# Patient Record
Sex: MALE | Race: BLACK OR AFRICAN AMERICAN | Employment: FULL TIME | URBAN - METROPOLITAN AREA
[De-identification: names, ages, dates, MRNs, and addresses within clinical notes are randomized per-mention and may not be internally consistent; named-entity substitution may affect disease eponyms.]

---

## 2018-01-01 NOTE — ED PROVIDER NOTES
Patient Seen in: [facility] AND [facility] Emergency Department    History   Patient presents with:  Dyspnea JONAH SOB (respiratory)      HPI    The patient presents complaining of a short episode of shortness of breath when he got use the bathroom.   He states he rate, regular rhythm and intact distal pulses. No murmur heard. Pulmonary/Chest: Effort normal and breath sounds normal. No stridor. No respiratory distress. He has no wheezes. He has no rales. Abdominal: Soft. He exhibits no distension.  There is no appointment as soon as possible for a visit in 3 days        Medications Prescribed:  Discharge Medication List as of 1/1/2018 12:19 AM    START taking these medications    ALPRAZolam 0.5 MG Oral Tab  Take 1 tablet (0.5 mg total) by mouth 2 (two) times husam

## 2018-01-01 NOTE — CM/SW NOTE
Patient requesting assistance with transportation home from the ED>   [employer] contacted thru his [facility] medicaid insurance, due to holiday , not transport ion available. Authorized cab voucher to assist patient in safe passage home.  Currently waiting

## 2018-01-01 NOTE — ED INITIAL ASSESSMENT (HPI)
Pt had difficulty breathing when he got up to use the bathroom. When EMS arrived, pt states it had resolved but came in to get checked out. No distress noted at this time, pt speaking in full sentences.

## 2018-02-06 ENCOUNTER — HOSPITAL ENCOUNTER (INPATIENT)
Facility: HOSPITAL | Age: 58
LOS: 2 days | Discharge: HOME | DRG: 291 | End: 2018-02-09
Admitting: HOSPITALIST
Payer: MEDICAID

## 2018-02-06 ENCOUNTER — APPOINTMENT (OUTPATIENT)
Dept: GENERAL RADIOLOGY | Facility: HOSPITAL | Age: 58
DRG: 291 | End: 2018-02-06
Payer: MEDICAID

## 2018-02-06 ENCOUNTER — APPOINTMENT (OUTPATIENT)
Dept: CT IMAGING | Facility: HOSPITAL | Age: 58
DRG: 291 | End: 2018-02-06
Attending: EMERGENCY MEDICINE
Payer: MEDICAID

## 2018-02-06 DIAGNOSIS — J18.9 COMMUNITY ACQUIRED PNEUMONIA OF RIGHT LOWER LOBE OF LUNG: Primary | ICD-10-CM

## 2018-02-06 DIAGNOSIS — R07.9 ACUTE CHEST PAIN: ICD-10-CM

## 2018-02-06 DIAGNOSIS — R77.8 ELEVATED TROPONIN I LEVEL: ICD-10-CM

## 2018-02-06 PROBLEM — D64.9 ANEMIA: Status: ACTIVE | Noted: 2018-02-06

## 2018-02-06 LAB
ANION GAP SERPL CALC-SCNC: 8 MMOL/L (ref 0–18)
BASOPHILS # BLD: 0 K/UL (ref 0–0.2)
BASOPHILS NFR BLD: 1 %
BNP SERPL-MCNC: 790 PG/ML (ref 0–100)
BUN SERPL-MCNC: 16 MG/DL (ref 8–20)
BUN/CREAT SERPL: 9.7 (ref 10–20)
CALCIUM SERPL-MCNC: 8 MG/DL (ref 8.5–10.5)
CHLORIDE SERPL-SCNC: 107 MMOL/L (ref 95–110)
CHOLEST SERPL-MCNC: 160 MG/DL (ref 110–200)
CO2 SERPL-SCNC: 23 MMOL/L (ref 22–32)
CREAT SERPL-MCNC: 1.65 MG/DL (ref 0.5–1.5)
D DIMER PPP FEU-MCNC: 0.66 MCG/ML (ref ?–0.55)
EOSINOPHIL # BLD: 0 K/UL (ref 0–0.7)
EOSINOPHIL NFR BLD: 0 %
ERYTHROCYTE [DISTWIDTH] IN BLOOD BY AUTOMATED COUNT: 14.7 % (ref 11–15)
GLUCOSE SERPL-MCNC: 106 MG/DL (ref 70–99)
HCT VFR BLD AUTO: 36 % (ref 41–52)
HDLC SERPL-MCNC: 57 MG/DL
HGB BLD-MCNC: 11.9 G/DL (ref 13.5–17.5)
LDLC SERPL CALC-MCNC: 87 MG/DL (ref 0–99)
LYMPHOCYTES # BLD: 0.8 K/UL (ref 1–4)
LYMPHOCYTES NFR BLD: 15 %
MCH RBC QN AUTO: 30.5 PG (ref 27–32)
MCHC RBC AUTO-ENTMCNC: 33 G/DL (ref 32–37)
MCV RBC AUTO: 92.5 FL (ref 80–100)
MONOCYTES # BLD: 0.7 K/UL (ref 0–1)
MONOCYTES NFR BLD: 14 %
NEUTROPHILS # BLD AUTO: 3.6 K/UL (ref 1.8–7.7)
NEUTROPHILS NFR BLD: 70 %
NONHDLC SERPL-MCNC: 103 MG/DL
OSMOLALITY UR CALC.SUM OF ELEC: 288 MOSM/KG (ref 275–295)
PLATELET # BLD AUTO: 173 K/UL (ref 140–400)
PMV BLD AUTO: 10.2 FL (ref 7.4–10.3)
POTASSIUM SERPL-SCNC: 3.9 MMOL/L (ref 3.3–5.1)
RBC # BLD AUTO: 3.89 M/UL (ref 4.5–5.9)
SODIUM SERPL-SCNC: 138 MMOL/L (ref 136–144)
TRIGL SERPL-MCNC: 78 MG/DL (ref 1–149)
TROPONIN I SERPL-MCNC: 0.05 NG/ML (ref ?–0.03)
WBC # BLD AUTO: 5.2 K/UL (ref 4–11)

## 2018-02-06 PROCEDURE — 71046 X-RAY EXAM CHEST 2 VIEWS: CPT

## 2018-02-06 PROCEDURE — 80048 BASIC METABOLIC PNL TOTAL CA: CPT | Performed by: EMERGENCY MEDICINE

## 2018-02-06 PROCEDURE — 96365 THER/PROPH/DIAG IV INF INIT: CPT

## 2018-02-06 PROCEDURE — 94640 AIRWAY INHALATION TREATMENT: CPT

## 2018-02-06 PROCEDURE — 71260 CT THORAX DX C+: CPT | Performed by: EMERGENCY MEDICINE

## 2018-02-06 PROCEDURE — 99285 EMERGENCY DEPT VISIT HI MDM: CPT

## 2018-02-06 PROCEDURE — 84484 ASSAY OF TROPONIN QUANT: CPT | Performed by: EMERGENCY MEDICINE

## 2018-02-06 PROCEDURE — 96375 TX/PRO/DX INJ NEW DRUG ADDON: CPT

## 2018-02-06 PROCEDURE — 85025 COMPLETE CBC W/AUTO DIFF WBC: CPT | Performed by: EMERGENCY MEDICINE

## 2018-02-06 PROCEDURE — 83880 ASSAY OF NATRIURETIC PEPTIDE: CPT | Performed by: EMERGENCY MEDICINE

## 2018-02-06 PROCEDURE — 93005 ELECTROCARDIOGRAM TRACING: CPT

## 2018-02-06 PROCEDURE — 96368 THER/DIAG CONCURRENT INF: CPT

## 2018-02-06 PROCEDURE — 85379 FIBRIN DEGRADATION QUANT: CPT | Performed by: EMERGENCY MEDICINE

## 2018-02-06 PROCEDURE — 87631 RESP VIRUS 3-5 TARGETS: CPT | Performed by: EMERGENCY MEDICINE

## 2018-02-06 PROCEDURE — 93010 ELECTROCARDIOGRAM REPORT: CPT | Performed by: EMERGENCY MEDICINE

## 2018-02-06 PROCEDURE — 36415 COLL VENOUS BLD VENIPUNCTURE: CPT

## 2018-02-06 PROCEDURE — 80061 LIPID PANEL: CPT | Performed by: EMERGENCY MEDICINE

## 2018-02-06 RX ORDER — IPRATROPIUM BROMIDE AND ALBUTEROL SULFATE 2.5; .5 MG/3ML; MG/3ML
3 SOLUTION RESPIRATORY (INHALATION) ONCE
Status: COMPLETED | OUTPATIENT
Start: 2018-02-06 | End: 2018-02-06

## 2018-02-07 PROBLEM — R07.9 ACUTE CHEST PAIN: Status: ACTIVE | Noted: 2018-02-07

## 2018-02-07 PROBLEM — R77.8 ELEVATED TROPONIN I LEVEL: Status: ACTIVE | Noted: 2018-02-07

## 2018-02-07 PROBLEM — R79.89 ELEVATED TROPONIN I LEVEL: Status: ACTIVE | Noted: 2018-02-07

## 2018-02-07 LAB
ANION GAP SERPL CALC-SCNC: 7 MMOL/L (ref 0–18)
BASOPHILS # BLD: 0 K/UL (ref 0–0.2)
BASOPHILS NFR BLD: 0 %
BUN SERPL-MCNC: 14 MG/DL (ref 8–20)
BUN/CREAT SERPL: 9.2 (ref 10–20)
CALCIUM SERPL-MCNC: 7.9 MG/DL (ref 8.5–10.5)
CHLORIDE SERPL-SCNC: 111 MMOL/L (ref 95–110)
CO2 SERPL-SCNC: 22 MMOL/L (ref 22–32)
CREAT SERPL-MCNC: 1.52 MG/DL (ref 0.5–1.5)
EOSINOPHIL # BLD: 0 K/UL (ref 0–0.7)
EOSINOPHIL NFR BLD: 0 %
ERYTHROCYTE [DISTWIDTH] IN BLOOD BY AUTOMATED COUNT: 14.3 % (ref 11–15)
FLUAV + FLUBV RNA SPEC NAA+PROBE: NEGATIVE
FLUAV + FLUBV RNA SPEC NAA+PROBE: NEGATIVE
FLUAV + FLUBV RNA SPEC NAA+PROBE: POSITIVE
GLUCOSE SERPL-MCNC: 103 MG/DL (ref 70–99)
HBA1C MFR BLD: 5.9 % (ref 4–6)
HCT VFR BLD AUTO: 35.3 % (ref 41–52)
HGB BLD-MCNC: 11.9 G/DL (ref 13.5–17.5)
LACTATE SERPL-SCNC: 1.1 MMOL/L (ref 0.5–2.2)
LYMPHOCYTES # BLD: 0.7 K/UL (ref 1–4)
LYMPHOCYTES NFR BLD: 15 %
MAGNESIUM SERPL-MCNC: 1.7 MG/DL (ref 1.8–2.5)
MCH RBC QN AUTO: 31.1 PG (ref 27–32)
MCHC RBC AUTO-ENTMCNC: 33.7 G/DL (ref 32–37)
MCV RBC AUTO: 92.1 FL (ref 80–100)
MONOCYTES # BLD: 0.5 K/UL (ref 0–1)
MONOCYTES NFR BLD: 10 %
NEUTROPHILS # BLD AUTO: 3.4 K/UL (ref 1.8–7.7)
NEUTROPHILS NFR BLD: 74 %
OSMOLALITY UR CALC.SUM OF ELEC: 291 MOSM/KG (ref 275–295)
PLATELET # BLD AUTO: 142 K/UL (ref 140–400)
PMV BLD AUTO: 9.3 FL (ref 7.4–10.3)
POTASSIUM SERPL-SCNC: 3.8 MMOL/L (ref 3.3–5.1)
PROCALCITONIN SERPL-MCNC: <0.05 NG/ML (ref ?–0.11)
RBC # BLD AUTO: 3.83 M/UL (ref 4.5–5.9)
SODIUM SERPL-SCNC: 140 MMOL/L (ref 136–144)
TROPONIN I SERPL-MCNC: 0.05 NG/ML (ref ?–0.03)
TROPONIN I SERPL-MCNC: 0.05 NG/ML (ref ?–0.03)
WBC # BLD AUTO: 4.6 K/UL (ref 4–11)

## 2018-02-07 PROCEDURE — 87040 BLOOD CULTURE FOR BACTERIA: CPT | Performed by: EMERGENCY MEDICINE

## 2018-02-07 PROCEDURE — 94640 AIRWAY INHALATION TREATMENT: CPT

## 2018-02-07 PROCEDURE — 85025 COMPLETE CBC W/AUTO DIFF WBC: CPT | Performed by: HOSPITALIST

## 2018-02-07 PROCEDURE — 83605 ASSAY OF LACTIC ACID: CPT | Performed by: EMERGENCY MEDICINE

## 2018-02-07 PROCEDURE — 84484 ASSAY OF TROPONIN QUANT: CPT | Performed by: HOSPITALIST

## 2018-02-07 PROCEDURE — 83735 ASSAY OF MAGNESIUM: CPT | Performed by: HOSPITALIST

## 2018-02-07 PROCEDURE — 83036 HEMOGLOBIN GLYCOSYLATED A1C: CPT | Performed by: HOSPITALIST

## 2018-02-07 PROCEDURE — A4216 STERILE WATER/SALINE, 10 ML: HCPCS | Performed by: HOSPITALIST

## 2018-02-07 PROCEDURE — 84145 PROCALCITONIN (PCT): CPT | Performed by: HOSPITALIST

## 2018-02-07 PROCEDURE — 80048 BASIC METABOLIC PNL TOTAL CA: CPT | Performed by: HOSPITALIST

## 2018-02-07 RX ORDER — ACETAMINOPHEN 325 MG/1
650 TABLET ORAL EVERY 6 HOURS PRN
Status: DISCONTINUED | OUTPATIENT
Start: 2018-02-07 | End: 2018-02-09

## 2018-02-07 RX ORDER — IPRATROPIUM BROMIDE AND ALBUTEROL SULFATE 2.5; .5 MG/3ML; MG/3ML
3 SOLUTION RESPIRATORY (INHALATION) ONCE
Status: COMPLETED | OUTPATIENT
Start: 2018-02-07 | End: 2018-02-07

## 2018-02-07 RX ORDER — MAGNESIUM OXIDE 400 MG (241.3 MG MAGNESIUM) TABLET
400 TABLET ONCE
Status: COMPLETED | OUTPATIENT
Start: 2018-02-07 | End: 2018-02-07

## 2018-02-07 RX ORDER — ASPIRIN 81 MG/1
81 TABLET, CHEWABLE ORAL DAILY
Status: DISCONTINUED | OUTPATIENT
Start: 2018-02-07 | End: 2018-02-09

## 2018-02-07 RX ORDER — LISINOPRIL 2.5 MG/1
2.5 TABLET ORAL DAILY
Status: DISCONTINUED | OUTPATIENT
Start: 2018-02-07 | End: 2018-02-09

## 2018-02-07 RX ORDER — FUROSEMIDE 40 MG/1
40 TABLET ORAL DAILY
COMMUNITY
End: 2018-02-14

## 2018-02-07 RX ORDER — FUROSEMIDE 10 MG/ML
40 INJECTION INTRAMUSCULAR; INTRAVENOUS DAILY
Status: DISCONTINUED | OUTPATIENT
Start: 2018-02-07 | End: 2018-02-09

## 2018-02-07 RX ORDER — ONDANSETRON 2 MG/ML
4 INJECTION INTRAMUSCULAR; INTRAVENOUS EVERY 6 HOURS PRN
Status: DISCONTINUED | OUTPATIENT
Start: 2018-02-07 | End: 2018-02-09

## 2018-02-07 RX ORDER — PREDNISONE 20 MG/1
40 TABLET ORAL
Status: DISCONTINUED | OUTPATIENT
Start: 2018-02-07 | End: 2018-02-09

## 2018-02-07 RX ORDER — LORAZEPAM 2 MG/ML
0.5 INJECTION INTRAMUSCULAR ONCE
Status: COMPLETED | OUTPATIENT
Start: 2018-02-07 | End: 2018-02-07

## 2018-02-07 RX ORDER — POTASSIUM CHLORIDE 20 MEQ/1
40 TABLET, EXTENDED RELEASE ORAL ONCE
Status: COMPLETED | OUTPATIENT
Start: 2018-02-07 | End: 2018-02-07

## 2018-02-07 RX ORDER — CARVEDILOL 3.12 MG/1
3.12 TABLET ORAL 2 TIMES DAILY
COMMUNITY
End: 2018-03-15

## 2018-02-07 RX ORDER — IPRATROPIUM BROMIDE AND ALBUTEROL SULFATE 2.5; .5 MG/3ML; MG/3ML
3 SOLUTION RESPIRATORY (INHALATION)
Status: DISCONTINUED | OUTPATIENT
Start: 2018-02-08 | End: 2018-02-09

## 2018-02-07 RX ORDER — IPRATROPIUM BROMIDE AND ALBUTEROL SULFATE 2.5; .5 MG/3ML; MG/3ML
3 SOLUTION RESPIRATORY (INHALATION)
Status: DISCONTINUED | OUTPATIENT
Start: 2018-02-07 | End: 2018-02-07

## 2018-02-07 RX ORDER — ALPRAZOLAM 0.5 MG/1
0.5 TABLET ORAL 2 TIMES DAILY PRN
Status: DISCONTINUED | OUTPATIENT
Start: 2018-02-07 | End: 2018-02-09

## 2018-02-07 RX ORDER — BISACODYL 10 MG
10 SUPPOSITORY, RECTAL RECTAL
Status: DISCONTINUED | OUTPATIENT
Start: 2018-02-07 | End: 2018-02-09

## 2018-02-07 RX ORDER — LISINOPRIL 2.5 MG/1
2.5 TABLET ORAL DAILY
COMMUNITY
End: 2018-03-21

## 2018-02-07 RX ORDER — POLYETHYLENE GLYCOL 3350 17 G/17G
17 POWDER, FOR SOLUTION ORAL DAILY PRN
Status: DISCONTINUED | OUTPATIENT
Start: 2018-02-07 | End: 2018-02-09

## 2018-02-07 RX ORDER — ATORVASTATIN CALCIUM 40 MG/1
40 TABLET, FILM COATED ORAL NIGHTLY
COMMUNITY
End: 2018-03-21

## 2018-02-07 RX ORDER — SODIUM CHLORIDE 0.9 % (FLUSH) 0.9 %
3 SYRINGE (ML) INJECTION AS NEEDED
Status: DISCONTINUED | OUTPATIENT
Start: 2018-02-07 | End: 2018-02-09

## 2018-02-07 RX ORDER — FUROSEMIDE 40 MG/1
40 TABLET ORAL
Status: DISCONTINUED | OUTPATIENT
Start: 2018-02-07 | End: 2018-02-07

## 2018-02-07 RX ORDER — AZITHROMYCIN 250 MG/1
500 TABLET, FILM COATED ORAL
Status: COMPLETED | OUTPATIENT
Start: 2018-02-07 | End: 2018-02-08

## 2018-02-07 RX ORDER — ATORVASTATIN CALCIUM 40 MG/1
40 TABLET, FILM COATED ORAL NIGHTLY
Status: DISCONTINUED | OUTPATIENT
Start: 2018-02-07 | End: 2018-02-09

## 2018-02-07 RX ORDER — HEPARIN SODIUM 5000 [USP'U]/ML
5000 INJECTION, SOLUTION INTRAVENOUS; SUBCUTANEOUS EVERY 8 HOURS SCHEDULED
Status: DISCONTINUED | OUTPATIENT
Start: 2018-02-07 | End: 2018-02-09

## 2018-02-07 RX ORDER — CARVEDILOL 3.12 MG/1
3.12 TABLET ORAL 2 TIMES DAILY
Status: DISCONTINUED | OUTPATIENT
Start: 2018-02-07 | End: 2018-02-09

## 2018-02-07 NOTE — CM/SW NOTE
2/7CM-MD orders received in regards to discharge planning. This Writer attempted to see the Patient but he was unarousable. Case Management will continue to follow.      -[facility] [id number]

## 2018-02-07 NOTE — H&P
[facility] Hospitalist H&P       CC: Patient presents with:  Cough/URI     PCP: No primary care provider on file.  PCP at [address] / PLAN:     Mr. Jose Yee is a 57 yo M with PMH of DCM (EF 25%), CKD 4, HTN, multisubstance abuse who Ling Keita is a 55 yo M with PMH of DCM (EF 25%), CKD 4, HTN, multisubstance abuse who presented with cough and shortness of breath for past 2 days. States he felt like he was coming down with the flu, felt fatigued. No sick contacts at home.  Denies chest pain dry  EXT: no edema    Diagnostic Data:    CBC/Chem    Recent Labs   Lab  02/06/18   2136  02/07/18   0532   RBC  3.89*  3.83*   HGB  11.9*  11.9*   HCT  36.0*  35.3*   MCV  92.5  92.1   MCH  30.5  31.1   MCHC  33.0  33.7   RDW  14.7  14.3   WBC  5.2  4.6 peribronchial thickening in the bilateral lower lobes suggesting bronchiolitis. 3. Subcarinal lymphadenopathy and right hilar lymphadenopathy. Borderline left hilar lymphadenopathy 4. No aortic dissection or aneurysmal dilatation. No pulmonary embolism.

## 2018-02-07 NOTE — ED PROVIDER NOTES
Patient Seen in: [facility] Emergency Department    History   Patient presents with:  Cough/URI    Stated Complaint: flu symptoms    HPI    Patient presents emergency department with cough, congestion over the last 2 days.   He states that he has h rales.   Abdominal: Soft. He exhibits no distension. There is no tenderness. Musculoskeletal: Normal range of motion. He exhibits no tenderness. Neurological: He is alert and oriented to person, place, and time. Skin: Skin is warm and dry.  No rash no ED Course as of Feb 06 2339  ------------------------------------------------------------       [facility]       Pulse Ox: 94%, Normal,     Cardiac Monitor: Pulse Readings from Last 1 Encounters:  02/06/18 : 87  , sinus,      Radiology findings: Xr Chest Pa

## 2018-02-07 NOTE — CONSULTS
Reason for Consultation: Heart failure    Assessment/Plan:     1. Dyspnea  - combination of  Systolic heart failure and RSV  2.  Acute on chronic systolic heart failure  - EF 20-30% TTE 8/17  - Yvonne 8/17 at [facility]: normal perfusion and EF 24%  - refused Lif ipratropium-albuterol (DUONEB) nebulizer solution 3 mL 3 mL Nebulization 6 times per day   azithromycin (ZITHROMAX) tab 500 mg 500 mg Oral Daily   predniSONE (DELTASONE) tab 40 mg 40 mg Oral Daily with breakfast   ALPRAZolam (XANAX) tab 0.5 mg 0.5 mg Ora NEURO/PSYCH:alert and oriented. Normal affect. CV:   PALP:PMI not displaced; no lifts, thrills or rubs. AUSC:regular rhythm; normal S1, S2 without S3; 1/6 systolic murmur. CAROTIDS:carotid pulses normal.   ABD AORTA:not enlarged.    FEM:femoral pulse lymphadenopathy. Borderline left hilar lymphadenopathy 4. No aortic dissection or aneurysmal dilatation. No pulmonary embolism. Thank you for allowing me to participate in the care of your patient.        *This note was dictated, in part, usi

## 2018-02-08 ENCOUNTER — APPOINTMENT (OUTPATIENT)
Dept: CV DIAGNOSTICS | Facility: HOSPITAL | Age: 58
DRG: 291 | End: 2018-02-08
Attending: INTERNAL MEDICINE
Payer: MEDICAID

## 2018-02-08 LAB
ANION GAP SERPL CALC-SCNC: 14 MMOL/L (ref 0–18)
BENZODIAZ UR QL SCN: DETECTED
BUN SERPL-MCNC: 26 MG/DL (ref 8–20)
BUN/CREAT SERPL: 15.6 (ref 10–20)
BZE UR QL SCN: DETECTED
CALCIUM SERPL-MCNC: 8.6 MG/DL (ref 8.5–10.5)
CHLORIDE SERPL-SCNC: 105 MMOL/L (ref 95–110)
CO2 SERPL-SCNC: 22 MMOL/L (ref 22–32)
CREAT SERPL-MCNC: 1.67 MG/DL (ref 0.5–1.5)
ERYTHROCYTE [DISTWIDTH] IN BLOOD BY AUTOMATED COUNT: 14.5 % (ref 11–15)
GLUCOSE SERPL-MCNC: 96 MG/DL (ref 70–99)
HCT VFR BLD AUTO: 37.1 % (ref 41–52)
HGB BLD-MCNC: 12.6 G/DL (ref 13.5–17.5)
MAGNESIUM SERPL-MCNC: 1.7 MG/DL (ref 1.8–2.5)
MCH RBC QN AUTO: 31 PG (ref 27–32)
MCHC RBC AUTO-ENTMCNC: 34 G/DL (ref 32–37)
MCV RBC AUTO: 91.2 FL (ref 80–100)
OSMOLALITY UR CALC.SUM OF ELEC: 297 MOSM/KG (ref 275–295)
PLATELET # BLD AUTO: 167 K/UL (ref 140–400)
PMV BLD AUTO: 10.2 FL (ref 7.4–10.3)
POTASSIUM SERPL-SCNC: 3.7 MMOL/L (ref 3.3–5.1)
PROCALCITONIN SERPL-MCNC: 0.05 NG/ML (ref ?–0.11)
RBC # BLD AUTO: 4.07 M/UL (ref 4.5–5.9)
SODIUM SERPL-SCNC: 141 MMOL/L (ref 136–144)
WBC # BLD AUTO: 5 K/UL (ref 4–11)

## 2018-02-08 PROCEDURE — 94640 AIRWAY INHALATION TREATMENT: CPT

## 2018-02-08 PROCEDURE — 93306 TTE W/DOPPLER COMPLETE: CPT | Performed by: INTERNAL MEDICINE

## 2018-02-08 PROCEDURE — 83735 ASSAY OF MAGNESIUM: CPT | Performed by: HOSPITALIST

## 2018-02-08 PROCEDURE — 80307 DRUG TEST PRSMV CHEM ANLYZR: CPT | Performed by: HOSPITALIST

## 2018-02-08 PROCEDURE — A4216 STERILE WATER/SALINE, 10 ML: HCPCS | Performed by: HOSPITALIST

## 2018-02-08 PROCEDURE — 80048 BASIC METABOLIC PNL TOTAL CA: CPT | Performed by: HOSPITALIST

## 2018-02-08 PROCEDURE — 85027 COMPLETE CBC AUTOMATED: CPT | Performed by: HOSPITALIST

## 2018-02-08 PROCEDURE — A4216 STERILE WATER/SALINE, 10 ML: HCPCS

## 2018-02-08 PROCEDURE — 84145 PROCALCITONIN (PCT): CPT | Performed by: HOSPITALIST

## 2018-02-08 RX ORDER — BENZONATATE 100 MG/1
100 CAPSULE ORAL 3 TIMES DAILY PRN
Status: DISCONTINUED | OUTPATIENT
Start: 2018-02-08 | End: 2018-02-09

## 2018-02-08 RX ORDER — PANTOPRAZOLE SODIUM 40 MG/1
40 TABLET, DELAYED RELEASE ORAL
Status: DISCONTINUED | OUTPATIENT
Start: 2018-02-08 | End: 2018-02-09

## 2018-02-08 RX ORDER — POTASSIUM CHLORIDE 20 MEQ/1
40 TABLET, EXTENDED RELEASE ORAL ONCE
Status: COMPLETED | OUTPATIENT
Start: 2018-02-08 | End: 2018-02-08

## 2018-02-08 RX ORDER — MAGNESIUM OXIDE 400 MG (241.3 MG MAGNESIUM) TABLET
400 TABLET ONCE
Status: COMPLETED | OUTPATIENT
Start: 2018-02-08 | End: 2018-02-08

## 2018-02-08 RX ORDER — 0.9 % SODIUM CHLORIDE 0.9 %
VIAL (ML) INJECTION
Status: COMPLETED
Start: 2018-02-08 | End: 2018-02-08

## 2018-02-08 NOTE — PAYOR COMM NOTE
--------------  ADMISSION REVIEW     Payor: [address] #:  [id number]  Authorization Number: N/A    Admit date: 2/7/18  Admit time: 1056       Admitting Physician: Rachel Lane DO  Attending Physician:  Cayetano Jacobs MD  Primary Temp: 99.5 °F (37.5 °C)  Temp src: Oral  SpO2: 98 %  O2 Device: None (Room air)[MP.2]    Current:[MP.1]/97   Pulse 87   Temp 99.5 °F (37.5 °C) (Oral)   Resp 20   Ht 180.3 cm (5' 11\")   Wt 82.6 kg   SpO2 94%   BMI 25.38 kg/m²[MP.2]         Physical E Narrative: The following orders were created for panel order CBC WITH DIFFERENTIAL WITH PLATELET.   Procedure                               Abnormality         Status                     ---------                               -----------         ----- Community acquired pneumonia of right lower lobe of lung ([facility] [address]  (primary encounter diagnosis)  Acute chest pain  Elevated troponin I level[MP. 2]    Disposition:[MP.1]  Admit  2/6/2018 11:39 pm[MP. 2]    Follow-up:[MP.1]  No follow-up provider specified. [MP. Acute on chronic Systolic CHF  - EF 43-28% on last TTE 8/2017 at [facility]  - recorded as nonischemic dilated cardiomyopathy on [address] records but no angiogram report found[GV.1]  - continue lisinopril, coreg[GV.4]    Elevated troponin   - currently History reviewed. No pertinent surgical history. ALL:  No Known Allergies     Home Medications:    Outpatient Prescriptions Marked as Taking for the 2/6/18 encounter [facility] Encounter):  furosemide 40 MG Oral Tab Take 40 mg by mouth daily.  Disp:  Rfl  02/07/2018   CA 7.9 02/07/2018   DDIMER 0.66 02/06/2018   MG 1.7 02/07/2018   TROP 0.05 02/07/2018       Recent Labs   Lab  02/06/18   2136  02/06/18   2343  02/07/18   0131   TROP  0.05*  0.05*  0.05*       Additional Diagnostics: ECG: NSR, LVH GV.1 Viktoriya Pelayo MD on 2/7/2018 10:19 AM   GV. 2 - Edda Hannah MD on 2/7/2018 11:29 AM   GV. 3 - Edda Hannah MD on 2/7/2018 11:22 AM   GV. 4 - Edda Hannah MD on 2/7/2018 11:25 AM                  MEDICATIONS ADMINISTERED IN LAST ipratropium-albuterol (DUONEB) nebulizer solution 3 mL     Date Action Dose Route User    2/7/2018 2345 Given 3 mL Nebulization Danii Rivas RCP      lisinopril (PRINIVIL,ZESTRIL) tab 2.5 mg     Date Action Dose Route User    2/7/2018 1002 Given 2.5 mg Oral Jose Yee is a 56year old male who is referred by Dr. Noemi Hassan for evaluation and management of heart failure. He has a history of a dilated cardiomyopathy with an ejection fraction of 20-30% on last TTE at [facility] in August 2017.   He now pr furosemide (LASIX) injection 40 mg 40 mg Intravenous Daily   atorvastatin (LIPITOR) tab 40 mg 40 mg Oral Nightly   carvedilol (COREG) tab 3.125 mg 3.125 mg Oral BID         Allergies:  No Known Allergies        EXAM:   Patient Vitals for the past 24 hrs:       Tele: Sinus rhythm     EKG: Sinus rhythm with LVH and repolarization abnormalities     Laboratory Data:       Recent Labs   Lab  02/06/18   2136  02/07/18   0532   GLU  106*  103*   BUN  16  14   CREATSERUM  1.65*  1.52*   GFRAA  53*  59*   GFRNAA  4

## 2018-02-08 NOTE — PROGRESS NOTES
[facility] Hospitalist Progress Note     CC: Hospital Follow up    PCP: No primary care provider on file.        Assessment/Plan:     Principal Problem:    Community acquired pneumonia of right lower lobe of lung ([facility] [address]  Active Problems:    Anemia    Acute chest pa opportunity to ask questions and note understanding and agreeing with therapeutic plan as outlined     Bartolo Brothers MD  [facility] Hospitalist  Answering Service number: [phone]    Total Time spent with patient and coordinating care:  35 minutes       S 105   CO2  23  22  22       No results for input(s): ALT, AST, ALB, AMYLASE, LIPASE, LDH in the last 72 hours. Invalid input(s): ALPHOS, TBIL, DBIL, TPROT      Imaging:  Xr Chest Pa + Lat Chest (cpt=71046)    Result Date: 2/6/2018  CONCLUSION:  1.  Jacobo

## 2018-02-08 NOTE — CONSULTS
Pulmonary H&P/Consult     NAME: Ronnie Taylor - ROOM: [address] [place] [address]- - MRN: [id number] - Age: 56year old - :  1960    Date of Admission: 2018  7:50 PM  Admission Diagnosis: Acute chest pain [R07.9]  Elevated troponin I level [R74.8]  [facility] acqu 2.5 mg Oral Daily   • furosemide  40 mg Intravenous Daily   • atorvastatin  40 mg Oral Nightly   • carvedilol  3.125 mg Oral BID   • ipratropium-albuterol  3 mL Nebulization TID       ALLERGIES: No Known Allergies    REVIEW OF SYSTEMS: 10 systems reviewed,

## 2018-02-08 NOTE — CM/SW NOTE
2/8/18 CM Discharge planning   Pt resides with g/f, reports independent prior to admission and plans to return ho when medically stable. Referral made to [address] [place], [place] [address]ison for Substance abuse recourses.   Mike Jones X [phone]

## 2018-02-08 NOTE — PROGRESS NOTES
[facility] Cardiology  Progress Note    Madelaine Wadsworth Patient Status:  Inpatient    1960 MRN [id number]   Location [facility] 3W/SW Attending Danielle Morales MD   Hosp Day # 1 PCP No primary care provider on file.      Impression tab 650 mg 650 mg Oral Q6H PRN   PEG 3350 (MIRALAX) powder packet 17 g 17 g Oral Daily PRN   bisacodyl (DULCOLAX) rectal suppository 10 mg 10 mg Rectal Daily PRN   Heparin Sodium (Porcine) 5000 UNIT/ML injection 5,000 Units 5,000 Units Subcutaneous [place] [address]

## 2018-02-08 NOTE — BH PROGRESS NOTE
Behavioral Health SOAP Note  SUBJECTIVE   The patient is a 57 y/o male with a h/o cocaine abuse. Today the patient was laying in bed during this writers visit. He is alert and oriented and is aware of where he is and why he is here.   Patient appeared tir

## 2018-02-09 VITALS
WEIGHT: 174.38 LBS | SYSTOLIC BLOOD PRESSURE: 123 MMHG | OXYGEN SATURATION: 94 % | RESPIRATION RATE: 18 BRPM | HEART RATE: 96 BPM | BODY MASS INDEX: 24.41 KG/M2 | TEMPERATURE: 98 F | HEIGHT: 71 IN | DIASTOLIC BLOOD PRESSURE: 100 MMHG

## 2018-02-09 LAB
ANION GAP SERPL CALC-SCNC: 8 MMOL/L (ref 0–18)
BUN SERPL-MCNC: 23 MG/DL (ref 8–20)
BUN/CREAT SERPL: 14.2 (ref 10–20)
CALCIUM SERPL-MCNC: 8.7 MG/DL (ref 8.5–10.5)
CHLORIDE SERPL-SCNC: 106 MMOL/L (ref 95–110)
CO2 SERPL-SCNC: 22 MMOL/L (ref 22–32)
CREAT SERPL-MCNC: 1.62 MG/DL (ref 0.5–1.5)
ERYTHROCYTE [DISTWIDTH] IN BLOOD BY AUTOMATED COUNT: 14.6 % (ref 11–15)
GLUCOSE SERPL-MCNC: 98 MG/DL (ref 70–99)
HCT VFR BLD AUTO: 39.9 % (ref 41–52)
HGB BLD-MCNC: 13.3 G/DL (ref 13.5–17.5)
MAGNESIUM SERPL-MCNC: 1.7 MG/DL (ref 1.8–2.5)
MCH RBC QN AUTO: 30.7 PG (ref 27–32)
MCHC RBC AUTO-ENTMCNC: 33.4 G/DL (ref 32–37)
MCV RBC AUTO: 92 FL (ref 80–100)
OSMOLALITY UR CALC.SUM OF ELEC: 286 MOSM/KG (ref 275–295)
PLATELET # BLD AUTO: 180 K/UL (ref 140–400)
PMV BLD AUTO: 10 FL (ref 7.4–10.3)
POTASSIUM SERPL-SCNC: 3.5 MMOL/L (ref 3.3–5.1)
POTASSIUM SERPL-SCNC: 3.5 MMOL/L (ref 3.3–5.1)
RBC # BLD AUTO: 4.33 M/UL (ref 4.5–5.9)
SODIUM SERPL-SCNC: 136 MMOL/L (ref 136–144)
WBC # BLD AUTO: 4.3 K/UL (ref 4–11)

## 2018-02-09 PROCEDURE — 94640 AIRWAY INHALATION TREATMENT: CPT

## 2018-02-09 PROCEDURE — 80048 BASIC METABOLIC PNL TOTAL CA: CPT | Performed by: HOSPITALIST

## 2018-02-09 PROCEDURE — 85027 COMPLETE CBC AUTOMATED: CPT | Performed by: HOSPITALIST

## 2018-02-09 PROCEDURE — 84132 ASSAY OF SERUM POTASSIUM: CPT | Performed by: HOSPITALIST

## 2018-02-09 PROCEDURE — 83735 ASSAY OF MAGNESIUM: CPT | Performed by: HOSPITALIST

## 2018-02-09 RX ORDER — IPRATROPIUM BROMIDE AND ALBUTEROL SULFATE 2.5; .5 MG/3ML; MG/3ML
3 SOLUTION RESPIRATORY (INHALATION) EVERY 6 HOURS PRN
Status: DISCONTINUED | OUTPATIENT
Start: 2018-02-09 | End: 2018-02-09

## 2018-02-09 RX ORDER — IPRATROPIUM BROMIDE AND ALBUTEROL SULFATE 2.5; .5 MG/3ML; MG/3ML
3 SOLUTION RESPIRATORY (INHALATION)
Status: DISCONTINUED | OUTPATIENT
Start: 2018-02-09 | End: 2018-02-09

## 2018-02-09 RX ORDER — POTASSIUM CHLORIDE 20 MEQ/1
40 TABLET, EXTENDED RELEASE ORAL EVERY 4 HOURS
Status: COMPLETED | OUTPATIENT
Start: 2018-02-09 | End: 2018-02-09

## 2018-02-09 RX ORDER — IPRATROPIUM BROMIDE AND ALBUTEROL SULFATE 2.5; .5 MG/3ML; MG/3ML
3 SOLUTION RESPIRATORY (INHALATION) EVERY 4 HOURS PRN
Status: DISCONTINUED | OUTPATIENT
Start: 2018-02-09 | End: 2018-02-09

## 2018-02-09 RX ORDER — IPRATROPIUM BROMIDE AND ALBUTEROL SULFATE 2.5; .5 MG/3ML; MG/3ML
SOLUTION RESPIRATORY (INHALATION)
Status: COMPLETED
Start: 2018-02-09 | End: 2018-02-09

## 2018-02-09 RX ORDER — MAGNESIUM OXIDE 400 MG (241.3 MG MAGNESIUM) TABLET
400 TABLET ONCE
Status: COMPLETED | OUTPATIENT
Start: 2018-02-09 | End: 2018-02-09

## 2018-02-09 NOTE — PROGRESS NOTES
Pulmonary Progress Note      NAME: Lotus Cannon - ROOM: [address] [place] [address] - MRN: [id number] - Age: 56year old - : 1960    Assessment/Plan:  1. Dyspnea - from RSV in the setting of acute decompensated heart failure.  CTA PE negative for PE, notable for bro atraumatic. Lips, mucosa, and tongue normal.  No thrush noted. Lungs: Clear to auscultation bilaterally, no focal wheezes or crackles    Chest wall: No tenderness or deformity. Heart: Regular rate and rhythm, normal S1S2, no murmur.    Abdomen: soft, non

## 2018-02-09 NOTE — BH PROGRESS NOTE
Behavioral Health SOAP Note  SUBJECTIVE           The patient is a 57 y/o male with a h/o cocaine abuse. Today the patient was sitting up in bed during this writers visit. He is alert and oriented and is aware of where he is and why he is here.   Patient Johny Fraction LCSW

## 2018-02-09 NOTE — DISCHARGE SUMMARY
General Medicine Discharge Summary     Patient ID:  Kadeem Rodas  56year old  6/24/1960    Admit date: 2/6/2018    Discharge date and time: 02/09/18    Attending Physician: Niyah Frost MD     Primary Care Physician: No primary care provider on bobby for bronchitis; discontinue prior to discharge  - admits to snorting cocaine over weekend; possibly pneumonitis due to cocaine  - pulm consulted, appreciate recs     Acute on chronic Systolic CHF  - EF 53-48% on last TTE 8/2017 at [facility]  - record lower lobe, left basilar regions. . 2. Superimposed peribronchial thickening in the bilateral lower lobes suggesting bronchiolitis. 3. Subcarinal lymphadenopathy and right hilar lymphadenopathy. Borderline left hilar lymphadenopathy 4.  No aortic dissection

## 2018-02-09 NOTE — PROGRESS NOTES
[facility] Cardiology  Progress Note    Gabikeron Elder Patient Status:  Inpatient    1960 MRN [id number]   Location [facility] 3W/SW Attending Bibiana Lei MD   Hosp Day # 2 PCP No primary care provider on file.      Impression Chloride ER (K-DUR M20) CR tab 40 mEq 40 mEq Oral Q4H   magnesium oxide (MAG-OX) tab 400 mg 400 mg Oral Once   benzonatate (TESSALON) cap 100 mg 100 mg Oral TID PRN   benzocaine-menthol (CEPACOL (SUGAR-FREE)) 1 lozenge 1 lozenge Oral PRN   Pantoprazole Sod BUN 23 02/09/2018   CREATSERUM 1.62 02/09/2018   GLU 98 02/09/2018   CA 8.7 02/09/2018   MG 1.7 02/09/2018     Recent Labs   Lab  02/06/18   2136  02/06/18   2343  02/07/18   0131   TROP  0.05*  0.05*  0.05*         Jobie Rubinstein, MD

## 2018-02-11 ENCOUNTER — TELEPHONE (OUTPATIENT)
Dept: MEDSURG UNIT | Facility: HOSPITAL | Age: 58
End: 2018-02-11

## 2018-02-14 ENCOUNTER — OFFICE VISIT (OUTPATIENT)
Dept: CARDIOLOGY CLINIC | Facility: HOSPITAL | Age: 58
End: 2018-02-14
Attending: INTERNAL MEDICINE
Payer: MEDICAID

## 2018-02-14 VITALS
WEIGHT: 182.5 LBS | BODY MASS INDEX: 25 KG/M2 | HEART RATE: 96 BPM | OXYGEN SATURATION: 99 % | DIASTOLIC BLOOD PRESSURE: 108 MMHG | SYSTOLIC BLOOD PRESSURE: 140 MMHG

## 2018-02-14 DIAGNOSIS — I50.9 HEART FAILURE, UNSPECIFIED (HCC): Primary | ICD-10-CM

## 2018-02-14 DIAGNOSIS — I50.23 ACUTE ON CHRONIC SYSTOLIC HEART FAILURE (HCC): ICD-10-CM

## 2018-02-14 PROBLEM — F19.10 SUBSTANCE ABUSE (HCC): Status: ACTIVE | Noted: 2018-02-14

## 2018-02-14 PROBLEM — I10 HTN (HYPERTENSION): Status: ACTIVE | Noted: 2018-02-14

## 2018-02-14 LAB
ANION GAP SERPL CALC-SCNC: 6 MMOL/L (ref 0–18)
BNP SERPL-MCNC: 302 PG/ML (ref 0–100)
BUN SERPL-MCNC: 22 MG/DL (ref 8–20)
BUN/CREAT SERPL: 14.5 (ref 10–20)
CALCIUM SERPL-MCNC: 8.4 MG/DL (ref 8.5–10.5)
CHLORIDE SERPL-SCNC: 113 MMOL/L (ref 95–110)
CO2 SERPL-SCNC: 23 MMOL/L (ref 22–32)
CREAT SERPL-MCNC: 1.52 MG/DL (ref 0.5–1.5)
GLUCOSE SERPL-MCNC: 82 MG/DL (ref 70–99)
OSMOLALITY UR CALC.SUM OF ELEC: 296 MOSM/KG (ref 275–295)
POTASSIUM SERPL-SCNC: 4.1 MMOL/L (ref 3.3–5.1)
SODIUM SERPL-SCNC: 142 MMOL/L (ref 136–144)

## 2018-02-14 PROCEDURE — 36415 COLL VENOUS BLD VENIPUNCTURE: CPT | Performed by: CLINICAL NURSE SPECIALIST

## 2018-02-14 PROCEDURE — 80048 BASIC METABOLIC PNL TOTAL CA: CPT

## 2018-02-14 PROCEDURE — 83880 ASSAY OF NATRIURETIC PEPTIDE: CPT

## 2018-02-14 PROCEDURE — 99214 OFFICE O/P EST MOD 30 MIN: CPT | Performed by: CLINICAL NURSE SPECIALIST

## 2018-02-14 PROCEDURE — 99211 OFF/OP EST MAY X REQ PHY/QHP: CPT | Performed by: CLINICAL NURSE SPECIALIST

## 2018-02-14 RX ORDER — FUROSEMIDE 20 MG/1
TABLET ORAL
Status: COMPLETED
Start: 2018-02-14 | End: 2018-02-14

## 2018-02-14 RX ORDER — FUROSEMIDE 40 MG/1
TABLET ORAL
Qty: 45 TABLET | Refills: 0 | Status: SHIPPED | OUTPATIENT
Start: 2018-02-14 | End: 2018-03-21

## 2018-02-14 RX ORDER — ALBUTEROL SULFATE 90 UG/1
1 AEROSOL, METERED RESPIRATORY (INHALATION) EVERY 6 HOURS PRN
Qty: 1 INHALER | Refills: 0 | Status: SHIPPED | OUTPATIENT
Start: 2018-02-14 | End: 2018-03-16

## 2018-02-14 RX ADMIN — FUROSEMIDE 40 MG: 20 TABLET ORAL at 11:21:00

## 2018-02-14 NOTE — PROGRESS NOTES
[address] [place] [address] Patient Status:  Outpatient    1960 MRN [id number]   Location [address] [place] [address] No primary care provider on file.          Candice Hernandez is a 56year old male who presents to clinic for as tenderness/mass/nodules  Lungs: Bibasilar crackles  Chest wall: no tenderness  Heart: S1, S2 normal, no murmur, click, rub or gallop, regular rate and rhythm  Abdomen: soft, non-tender; bowel sounds normal; no masses,  no organomegaly  Extremities: extremi edema. Denies abdominal swelling/bloating. +DALEY walking through the grocery store yesterday. Very fidgety and anxious in clinic today. Denies further drug use - states on admission he only \"tasted\" cocaine.  Reports an improvement in chest pain in the pas

## 2018-02-14 NOTE — PATIENT INSTRUCTIONS
Thursday, Friday and Saturday please take furosemide 40 mg in the morning and furosemide 40 mg in the afternoon and then starting Sunday, taking furosemide 40 mg daily    Continue tracking daily weights.  Call with weight gain of 3 lbs overnight or concerni

## 2018-03-13 ENCOUNTER — HOSPITAL ENCOUNTER (INPATIENT)
Facility: HOSPITAL | Age: 58
LOS: 1 days | Discharge: HOME | DRG: 286 | End: 2018-03-15
Attending: HOSPITALIST | Admitting: EMERGENCY MEDICINE
Payer: MEDICAID

## 2018-03-13 ENCOUNTER — APPOINTMENT (OUTPATIENT)
Dept: GENERAL RADIOLOGY | Facility: HOSPITAL | Age: 58
DRG: 286 | End: 2018-03-13
Attending: NURSE PRACTITIONER
Payer: MEDICAID

## 2018-03-13 DIAGNOSIS — R77.8 ELEVATED TROPONIN: ICD-10-CM

## 2018-03-13 DIAGNOSIS — F14.10 COCAINE ABUSE (HCC): ICD-10-CM

## 2018-03-13 DIAGNOSIS — R07.9 ACUTE CHEST PAIN: Primary | ICD-10-CM

## 2018-03-13 DIAGNOSIS — N28.9 IMPAIRED RENAL FUNCTION: ICD-10-CM

## 2018-03-13 LAB
ANION GAP SERPL CALC-SCNC: 8 MMOL/L (ref 0–18)
BASOPHILS # BLD: 0 K/UL (ref 0–0.2)
BASOPHILS NFR BLD: 0 %
BNP SERPL-MCNC: 322 PG/ML (ref 0–100)
BUN SERPL-MCNC: 25 MG/DL (ref 8–20)
BUN/CREAT SERPL: 14 (ref 10–20)
BZE UR QL SCN: DETECTED
CALCIUM SERPL-MCNC: 9.2 MG/DL (ref 8.5–10.5)
CHLORIDE SERPL-SCNC: 104 MMOL/L (ref 95–110)
CHOLEST SERPL-MCNC: 177 MG/DL (ref 110–200)
CO2 SERPL-SCNC: 29 MMOL/L (ref 22–32)
CREAT SERPL-MCNC: 1.79 MG/DL (ref 0.5–1.5)
EOSINOPHIL # BLD: 0 K/UL (ref 0–0.7)
EOSINOPHIL NFR BLD: 1 %
ERYTHROCYTE [DISTWIDTH] IN BLOOD BY AUTOMATED COUNT: 14.7 % (ref 11–15)
GLUCOSE SERPL-MCNC: 76 MG/DL (ref 70–99)
HCT VFR BLD AUTO: 37.1 % (ref 41–52)
HDLC SERPL-MCNC: 68 MG/DL
HGB BLD-MCNC: 12.7 G/DL (ref 13.5–17.5)
LDLC SERPL CALC-MCNC: 80 MG/DL (ref 0–99)
LYMPHOCYTES # BLD: 0.8 K/UL (ref 1–4)
LYMPHOCYTES NFR BLD: 22 %
MCH RBC QN AUTO: 31.5 PG (ref 27–32)
MCHC RBC AUTO-ENTMCNC: 34.2 G/DL (ref 32–37)
MCV RBC AUTO: 92.1 FL (ref 80–100)
MONOCYTES # BLD: 0.4 K/UL (ref 0–1)
MONOCYTES NFR BLD: 11 %
NEUTROPHILS # BLD AUTO: 2.5 K/UL (ref 1.8–7.7)
NEUTROPHILS NFR BLD: 65 %
NONHDLC SERPL-MCNC: 109 MG/DL
OSMOLALITY UR CALC.SUM OF ELEC: 295 MOSM/KG (ref 275–295)
PLATELET # BLD AUTO: 194 K/UL (ref 140–400)
PMV BLD AUTO: 9.7 FL (ref 7.4–10.3)
POTASSIUM SERPL-SCNC: 3.9 MMOL/L (ref 3.3–5.1)
RBC # BLD AUTO: 4.02 M/UL (ref 4.5–5.9)
SODIUM SERPL-SCNC: 141 MMOL/L (ref 136–144)
TRIGL SERPL-MCNC: 145 MG/DL (ref 1–149)
TROPONIN I SERPL-MCNC: 0.04 NG/ML (ref ?–0.03)
TROPONIN I SERPL-MCNC: 0.1 NG/ML (ref ?–0.03)
WBC # BLD AUTO: 3.8 K/UL (ref 4–11)

## 2018-03-13 PROCEDURE — 99223 1ST HOSP IP/OBS HIGH 75: CPT | Performed by: HOSPITALIST

## 2018-03-13 PROCEDURE — 71046 X-RAY EXAM CHEST 2 VIEWS: CPT | Performed by: NURSE PRACTITIONER

## 2018-03-13 RX ORDER — TRAMADOL HYDROCHLORIDE 50 MG/1
50 TABLET ORAL ONCE
Status: COMPLETED | OUTPATIENT
Start: 2018-03-13 | End: 2018-03-13

## 2018-03-13 RX ORDER — ASPIRIN 81 MG/1
324 TABLET, CHEWABLE ORAL ONCE
Status: COMPLETED | OUTPATIENT
Start: 2018-03-13 | End: 2018-03-13

## 2018-03-13 NOTE — ED INITIAL ASSESSMENT (HPI)
Pt states he bent over in the shower and heard a \"pop\"- states pain radiates to front chest. Pt states \"My stomach is blow'd up. \" Breathing normal and unlabored. Skin pink warm dry.

## 2018-03-14 ENCOUNTER — APPOINTMENT (OUTPATIENT)
Dept: INTERVENTIONAL RADIOLOGY/VASCULAR | Facility: HOSPITAL | Age: 58
DRG: 286 | End: 2018-03-14
Attending: INTERNAL MEDICINE
Payer: MEDICAID

## 2018-03-14 PROBLEM — R79.89 ELEVATED TROPONIN: Status: ACTIVE | Noted: 2018-03-14

## 2018-03-14 PROBLEM — N28.9 IMPAIRED RENAL FUNCTION: Status: ACTIVE | Noted: 2018-03-14

## 2018-03-14 PROBLEM — R77.8 ELEVATED TROPONIN: Status: ACTIVE | Noted: 2018-03-14

## 2018-03-14 PROBLEM — F14.10 COCAINE ABUSE (HCC): Status: ACTIVE | Noted: 2018-03-14

## 2018-03-14 LAB
APTT PPP: 23.7 SECONDS (ref 23.2–35.3)
INR BLD: 1 (ref 0.9–1.2)
PROTHROMBIN TIME: 12.6 SECONDS (ref 11.8–14.5)
TROPONIN I SERPL-MCNC: 0.62 NG/ML (ref ?–0.03)
TROPONIN I SERPL-MCNC: 4.92 NG/ML (ref ?–0.03)

## 2018-03-14 PROCEDURE — B2111ZZ FLUOROSCOPY OF MULTIPLE CORONARY ARTERIES USING LOW OSMOLAR CONTRAST: ICD-10-PCS | Performed by: INTERNAL MEDICINE

## 2018-03-14 PROCEDURE — 99233 SBSQ HOSP IP/OBS HIGH 50: CPT | Performed by: HOSPITALIST

## 2018-03-14 PROCEDURE — 4A023N7 MEASUREMENT OF CARDIAC SAMPLING AND PRESSURE, LEFT HEART, PERCUTANEOUS APPROACH: ICD-10-PCS | Performed by: INTERNAL MEDICINE

## 2018-03-14 PROCEDURE — B2151ZZ FLUOROSCOPY OF LEFT HEART USING LOW OSMOLAR CONTRAST: ICD-10-PCS | Performed by: INTERNAL MEDICINE

## 2018-03-14 RX ORDER — ASPIRIN 81 MG/1
324 TABLET, CHEWABLE ORAL ONCE
Status: COMPLETED | OUTPATIENT
Start: 2018-03-14 | End: 2018-03-14

## 2018-03-14 RX ORDER — HYDROCODONE BITARTRATE AND ACETAMINOPHEN 5; 325 MG/1; MG/1
1 TABLET ORAL EVERY 6 HOURS PRN
Status: DISCONTINUED | OUTPATIENT
Start: 2018-03-14 | End: 2018-03-15

## 2018-03-14 RX ORDER — LIDOCAINE HYDROCHLORIDE 20 MG/ML
INJECTION, SOLUTION EPIDURAL; INFILTRATION; INTRACAUDAL; PERINEURAL
Status: COMPLETED
Start: 2018-03-14 | End: 2018-03-14

## 2018-03-14 RX ORDER — HEPARIN SODIUM AND DEXTROSE 10000; 5 [USP'U]/100ML; G/100ML
INJECTION INTRAVENOUS CONTINUOUS
Status: DISCONTINUED | OUTPATIENT
Start: 2018-03-14 | End: 2018-03-14

## 2018-03-14 RX ORDER — HEPARIN SODIUM 1000 [USP'U]/ML
INJECTION, SOLUTION INTRAVENOUS; SUBCUTANEOUS
Status: COMPLETED
Start: 2018-03-14 | End: 2018-03-14

## 2018-03-14 RX ORDER — ATORVASTATIN CALCIUM 40 MG/1
40 TABLET, FILM COATED ORAL NIGHTLY
Status: DISCONTINUED | OUTPATIENT
Start: 2018-03-14 | End: 2018-03-15

## 2018-03-14 RX ORDER — HYDRALAZINE HYDROCHLORIDE 20 MG/ML
10 INJECTION INTRAMUSCULAR; INTRAVENOUS EVERY 4 HOURS PRN
Status: DISCONTINUED | OUTPATIENT
Start: 2018-03-14 | End: 2018-03-15

## 2018-03-14 RX ORDER — MIDAZOLAM HYDROCHLORIDE 1 MG/ML
INJECTION INTRAMUSCULAR; INTRAVENOUS
Status: DISCONTINUED
Start: 2018-03-14 | End: 2018-03-14 | Stop reason: WASHOUT

## 2018-03-14 RX ORDER — LORAZEPAM 2 MG/ML
0.5 INJECTION INTRAMUSCULAR EVERY 8 HOURS PRN
Status: DISCONTINUED | OUTPATIENT
Start: 2018-03-14 | End: 2018-03-15

## 2018-03-14 RX ORDER — HEPARIN SODIUM 5000 [USP'U]/ML
5000 INJECTION, SOLUTION INTRAVENOUS; SUBCUTANEOUS EVERY 12 HOURS
Status: DISCONTINUED | OUTPATIENT
Start: 2018-03-14 | End: 2018-03-14

## 2018-03-14 RX ORDER — VERAPAMIL HYDROCHLORIDE 2.5 MG/ML
INJECTION, SOLUTION INTRAVENOUS
Status: COMPLETED
Start: 2018-03-14 | End: 2018-03-14

## 2018-03-14 RX ORDER — CARVEDILOL 3.12 MG/1
3.12 TABLET ORAL 2 TIMES DAILY
Status: DISCONTINUED | OUTPATIENT
Start: 2018-03-14 | End: 2018-03-14

## 2018-03-14 RX ORDER — HEPARIN SODIUM AND DEXTROSE 10000; 5 [USP'U]/100ML; G/100ML
12 INJECTION INTRAVENOUS ONCE
Status: COMPLETED | OUTPATIENT
Start: 2018-03-14 | End: 2018-03-14

## 2018-03-14 RX ORDER — NITROGLYCERIN 20 MG/100ML
INJECTION INTRAVENOUS
Status: COMPLETED
Start: 2018-03-14 | End: 2018-03-14

## 2018-03-14 RX ORDER — ASPIRIN 81 MG/1
81 TABLET ORAL DAILY
Status: DISCONTINUED | OUTPATIENT
Start: 2018-03-14 | End: 2018-03-15

## 2018-03-14 RX ORDER — POLYETHYLENE GLYCOL 3350 17 G/17G
17 POWDER, FOR SOLUTION ORAL DAILY PRN
Status: DISCONTINUED | OUTPATIENT
Start: 2018-03-14 | End: 2018-03-15

## 2018-03-14 RX ORDER — 0.9 % SODIUM CHLORIDE 0.9 %
VIAL (ML) INJECTION
Status: COMPLETED
Start: 2018-03-14 | End: 2018-03-14

## 2018-03-14 RX ORDER — LISINOPRIL 2.5 MG/1
2.5 TABLET ORAL DAILY
Status: DISCONTINUED | OUTPATIENT
Start: 2018-03-14 | End: 2018-03-14

## 2018-03-14 RX ORDER — CHLORHEXIDINE GLUCONATE 4 G/100ML
30 SOLUTION TOPICAL
Status: COMPLETED | OUTPATIENT
Start: 2018-03-14 | End: 2018-03-14

## 2018-03-14 RX ORDER — ACETAMINOPHEN 325 MG/1
650 TABLET ORAL EVERY 6 HOURS PRN
Status: DISCONTINUED | OUTPATIENT
Start: 2018-03-14 | End: 2018-03-15

## 2018-03-14 RX ORDER — ALBUTEROL SULFATE 90 UG/1
1 AEROSOL, METERED RESPIRATORY (INHALATION) EVERY 6 HOURS PRN
Status: DISCONTINUED | OUTPATIENT
Start: 2018-03-14 | End: 2018-03-15

## 2018-03-14 RX ORDER — SODIUM CHLORIDE 9 MG/ML
INJECTION, SOLUTION INTRAVENOUS CONTINUOUS
Status: DISCONTINUED | OUTPATIENT
Start: 2018-03-14 | End: 2018-03-15

## 2018-03-14 RX ORDER — FUROSEMIDE 40 MG/1
40 TABLET ORAL DAILY
Status: DISCONTINUED | OUTPATIENT
Start: 2018-03-14 | End: 2018-03-14

## 2018-03-14 RX ORDER — LORAZEPAM 2 MG/ML
0.5 CONCENTRATE ORAL EVERY 8 HOURS PRN
Status: DISCONTINUED | OUTPATIENT
Start: 2018-03-14 | End: 2018-03-14

## 2018-03-14 RX ORDER — SODIUM CHLORIDE 9 MG/ML
INJECTION, SOLUTION INTRAVENOUS CONTINUOUS
Status: ACTIVE | OUTPATIENT
Start: 2018-03-14 | End: 2018-03-14

## 2018-03-14 RX ORDER — ONDANSETRON 2 MG/ML
4 INJECTION INTRAMUSCULAR; INTRAVENOUS EVERY 6 HOURS PRN
Status: DISCONTINUED | OUTPATIENT
Start: 2018-03-14 | End: 2018-03-15

## 2018-03-14 RX ORDER — HEPARIN SODIUM 1000 [USP'U]/ML
60 INJECTION, SOLUTION INTRAVENOUS; SUBCUTANEOUS ONCE
Status: COMPLETED | OUTPATIENT
Start: 2018-03-14 | End: 2018-03-14

## 2018-03-14 RX ORDER — MIDAZOLAM HYDROCHLORIDE 1 MG/ML
INJECTION INTRAMUSCULAR; INTRAVENOUS
Status: COMPLETED
Start: 2018-03-14 | End: 2018-03-14

## 2018-03-14 NOTE — PROGRESS NOTES
[facility]    Progress Note    Dominique Vernsteffanie Patient Status:  Inpatient    1960 MRN [id number]   Location [facility] 3W/SW Attending Genevieve Fuller MD   Hosp Day # 0 PCP No primary care provider on file.        Subjective: airspace disease, which may represent pneumonia in the appropriate clinical setting. Followup is recommended to document resolution.          Ekg 12-lead    Result Date: 3/14/2018  ECG Report  Interpretation  --------------------------     Ekg 12-lead    Re

## 2018-03-14 NOTE — PROGRESS NOTES
Pt s/p LHC, right radial approach. TR band w/ 8ml air in place. Report called to Reji Duke RN on floor. Will transfer pt back to room hemodynamically stable.

## 2018-03-14 NOTE — CM/SW NOTE
3/17-MD orders received in regards to ETOH/Substance Abuse. This Writer requested Sandra GARCIA to request a Psych Liaison consult to assist with the ETOH. Substance Abuse.   Case Management will continue to follow, If the Patient requires medical assistance C

## 2018-03-14 NOTE — H&P
[address] Patient Status:  Emergency    1960 MRN [id number]   Location [address] Attending No att. providers found   Hosp Day # 0 PCP No primary care provider on f directed   lisinopril 2.5 MG Oral Tab   Yes No   Sig: Take 2.5 mg by mouth daily. Facility-Administered Medications: None       Review of Systems:  Constitutional:  Weakness, Fatigue. Eye:  Negative. Ear/Nose/Mouth/Throat:  Negative.   Respiratory: 03/13/2018   GLU 76 03/13/2018   CA 9.2 03/13/2018   TROP 0.10 03/13/2018       Imaging:  No exam resulted this encounter.     Assessment and Plan:    Chest pain probable unstable angina  Mild elevation troponins, with diffuse T-wave inversions, cardiology

## 2018-03-14 NOTE — PLAN OF CARE
Problem: Patient/Family Goals  Goal: Patient/Family Long Term Goal  Patient's Long Term Goal: return home  Interventions:  - monitor VS, labs  Angiogram  Medication as per orders  - See additional Care Plan goals for specific interventions    Outcome: Prog delivery of care  - Encourage patient/family to participate in care and decision-making at the level they choose  - Honor patient and family perspectives and choices   Outcome: Progressing

## 2018-03-14 NOTE — ED PROVIDER NOTES
Patient Seen in: [facility] Emergency Department    History   Patient presents with:  Back Pain (musculoskeletal)    Stated Complaint: Back Pain/ Chest Pain     HPI  Patient presents into the emergency room for evaluation of back pain.   Patient st complaint: Back Pain/ Chest Pain   Other systems are as noted in HPI. Constitutional and vital signs reviewed. All other systems reviewed and negative except as noted above.     Physical Exam   ED Triage Vitals [03/13/18 1806]  BP: (!) 140/112  Pulse: components within normal limits   DRUG ABUSE PANEL 10 SCREEN - Abnormal; Notable for the following:     Ur. Cocaine Screen Detected (*)     All other components within normal limits   CBC W/ DIFFERENTIAL - Abnormal; Notable for the following:     WBC 3.8 ( Value Ref Range   Glucose 76 70 - 99 mg/dL   Sodium 141 136 - 144 mmol/L   Potassium 3.9 3.3 - 5.1 mmol/L   Chloride 104 95 - 110 mmol/L   CO2 29 22 - 32 mmol/L   BUN 25 (H) 8 - 20 mg/dL   Creatinine 1.79 (H) 0.50 - 1.50 mg/dL   Calcium, Total 9.2 8.5 - 10 RDW 14.7 11.0 - 15.0 %    140 - 400 K/UL   MPV 9.7 7.4 - 10.3 fL   Neutrophil % 65 %   Lymphocyte % 22 %   Monocyte % 11 %   Eosinophil % 1 %   Basophil % 0 %   Neutrophil Absolute 2.5 1.8 - 7.7 K/UL   Lymphocyte Absolute 0.8 (L) 1.0 - 4.0 K/UL encounter diagnosis)  Cocaine abuse  Impaired renal function  Elevated troponin    Disposition:  Admit  3/13/2018 10:26 pm    Follow-up:  No follow-up provider specified.       Medications Prescribed:  Current Discharge Medication List        Present on Adm

## 2018-03-14 NOTE — CM/SW NOTE
DX: Back Pain/Chest Pain - Pt has positive troponin's first 0.04 & second rising 0.10. Pt's is hypertensive 142/107 most recent B/P. Pt unstable for transfer. Pt is OON.

## 2018-03-14 NOTE — CONSULTS
Reason for Consultation: chest pain    Assessment/Plan:     1. NSTEMI  - positional pain  - elevated troponin  - recent cocaine  2.  Acute on chronic systolic heart failure  - Echo EF 25% 2/18  - EF 20-30% TTE 8/17  - Yvonne 8/17 at [facility]: normal perfusion a (Porcine) 5000 UNIT/ML injection 5,000 Units 5,000 Units Subcutaneous Q12H   hydrALAzine HCl (APRESOLINE) injection 10 mg 10 mg Intravenous Q4H PRN   acetaminophen (TYLENOL) tab 650 mg 650 mg Oral Q6H PRN   HYDROcodone-acetaminophen (NORCO) 5-325 MG per ta normal.   ABD AORTA:not enlarged. FEM:femoral pulses intact. PEDAL:pedal pulses intact. EXT:no peripheral edema.       LABORATORY DATA:     Labs reviewed:      Tele:  SR    EKG: Sinus rhythm with nonspecific T-wave changes    Laboratory Data:  Recent L

## 2018-03-15 VITALS
SYSTOLIC BLOOD PRESSURE: 124 MMHG | WEIGHT: 172.88 LBS | OXYGEN SATURATION: 98 % | TEMPERATURE: 98 F | HEIGHT: 71 IN | RESPIRATION RATE: 18 BRPM | BODY MASS INDEX: 24.2 KG/M2 | DIASTOLIC BLOOD PRESSURE: 96 MMHG | HEART RATE: 85 BPM

## 2018-03-15 LAB
ANION GAP SERPL CALC-SCNC: 5 MMOL/L (ref 0–18)
BUN SERPL-MCNC: 21 MG/DL (ref 8–20)
BUN/CREAT SERPL: 13.6 (ref 10–20)
CALCIUM SERPL-MCNC: 8.7 MG/DL (ref 8.5–10.5)
CHLORIDE SERPL-SCNC: 106 MMOL/L (ref 95–110)
CO2 SERPL-SCNC: 27 MMOL/L (ref 22–32)
CREAT SERPL-MCNC: 1.54 MG/DL (ref 0.5–1.5)
ERYTHROCYTE [DISTWIDTH] IN BLOOD BY AUTOMATED COUNT: 14.6 % (ref 11–15)
GLUCOSE SERPL-MCNC: 108 MG/DL (ref 70–99)
HCT VFR BLD AUTO: 38.8 % (ref 41–52)
HGB BLD-MCNC: 13.2 G/DL (ref 13.5–17.5)
MAGNESIUM SERPL-MCNC: 1.7 MG/DL (ref 1.8–2.5)
MCH RBC QN AUTO: 31.1 PG (ref 27–32)
MCHC RBC AUTO-ENTMCNC: 34 G/DL (ref 32–37)
MCV RBC AUTO: 91.5 FL (ref 80–100)
OSMOLALITY UR CALC.SUM OF ELEC: 290 MOSM/KG (ref 275–295)
PLATELET # BLD AUTO: 174 K/UL (ref 140–400)
PMV BLD AUTO: 9.3 FL (ref 7.4–10.3)
POTASSIUM SERPL-SCNC: 3.9 MMOL/L (ref 3.3–5.1)
RBC # BLD AUTO: 4.24 M/UL (ref 4.5–5.9)
SODIUM SERPL-SCNC: 138 MMOL/L (ref 136–144)
WBC # BLD AUTO: 3.2 K/UL (ref 4–11)

## 2018-03-15 PROCEDURE — 99239 HOSP IP/OBS DSCHRG MGMT >30: CPT | Performed by: HOSPITALIST

## 2018-03-15 RX ORDER — MAGNESIUM SULFATE HEPTAHYDRATE 40 MG/ML
2 INJECTION, SOLUTION INTRAVENOUS ONCE
Status: COMPLETED | OUTPATIENT
Start: 2018-03-15 | End: 2018-03-15

## 2018-03-15 RX ORDER — MAGNESIUM SULFATE HEPTAHYDRATE 40 MG/ML
2 INJECTION, SOLUTION INTRAVENOUS ONCE
Status: DISCONTINUED | OUTPATIENT
Start: 2018-03-15 | End: 2018-03-15

## 2018-03-15 RX ORDER — LISINOPRIL 2.5 MG/1
2.5 TABLET ORAL DAILY
Status: DISCONTINUED | OUTPATIENT
Start: 2018-03-15 | End: 2018-03-15

## 2018-03-15 RX ORDER — FUROSEMIDE 40 MG/1
40 TABLET ORAL DAILY
Status: DISCONTINUED | OUTPATIENT
Start: 2018-03-15 | End: 2018-03-15

## 2018-03-15 NOTE — DISCHARGE SUMMARY
[facility] - [facility]    Discharge Summary    Huber Diallo Patient Status:  Inpatient    1960 MRN [id number]   Location [facility] 3W/SW Attending Ana Manuel MD   Hosp Day # 1 PCP No primary care provider on file.      Date of A hypertension, chronic kidney disease and substance abuse presents with a complaint of sudden onset back pain radiating towards his chest while in the shower earlier today.   Describes the onset as sudden, with no clear aggravating or relieving factors, pres LIPITOR      Take 40 mg by mouth nightly.    Refills:  0     furosemide 40 MG Tabs  Commonly known as:  LASIX      1 tab daily and as directed   Quantity:  45 tablet  Refills:  0     lisinopril 2.5 MG Tabs  Commonly known as:  PRINIVIL,ZESTRIL      Take 2.5

## 2018-03-15 NOTE — PAYOR COMM NOTE
--------------  CONTINUED STAY REVIEW    Payor: [address] #:  [id number]  Authorization Number: N/A    Admit date: 2/7/18  Admit time: 8974    Admitting Physician: Yadi Salinas DO  Attending Physician:  No att. providers found  Lazarus Cassis 02/09/18    Attending Physician: Melissa Tovar MD     Primary Care Physician: No primary care provider on file.  PCP at [facility]    Reason for admission: shortness of breath, chest pain    Discharge Diagnoses: Acute chest pain [R07.9]  Hermes Ortiz CHF  - EF 20-30% on last TTE 8/2017 at [facility]  - recorded as nonischemic dilated cardiomyopathy on [facility] records but no angiogram report found  - continue lisinopril, coreg     Elevated troponin   - currently without chest pain  - EKG without ST Disposition: home    Home Medication Changes:        Medication List      CONTINUE taking these medications    aspirin 81 MG Tabs     atorvastatin 40 MG Tabs  Commonly known as:  LIPITOR     carvedilol 3.125 MG Tabs  Commonly known as:  COREG     fu furosemide (LASIX) tab 40 mg     Date Action Dose Route User    3/15/2018 0837 Given 40 mg Oral Hien Torres RN      HYDROcodone-acetaminophen (NORCO) 5-325 MG per tab 1 tablet     Date Action Dose Route User    3/14/2018 1052 Given 1 tablet Oral

## 2018-03-15 NOTE — CM/SW NOTE
3/15CM-The Patient's RN informed this Writer that the Patient's daughter is going to pick him up, this Writer cancelled the transport with [facility]. 3:23p. m.3/15CM-The Patient's RN informed this Writer that the Patient will need transportation his ho

## 2018-03-15 NOTE — PROGRESS NOTES
[facility] Cardiology  Progress Note    [facility] Patient Status:  Inpatient    1960 MRN [id number]   Location [facility] 3W/SW Attending Isabell Turk MD   Hosp Day # 1 PCP No primary care provider on file.      Impression: Sulfate  (90 Base) MCG/ACT inhaler 1 puff 1 puff Inhalation Q6H PRN   aspirin EC tab 81 mg 81 mg Oral Daily   atorvastatin (LIPITOR) tab 40 mg 40 mg Oral Nightly   ondansetron HCl (ZOFRAN) injection 4 mg 4 mg Intravenous Q6H PRN   hydrALAzine HCl (A

## 2018-03-15 NOTE — PLAN OF CARE
Problem: Patient/Family Goals  Goal: Patient/Family Long Term Goal  Patient's Long Term Goal: return home  Interventions:  - monitor VS, labs  Angiogram  Medication as per orders  - See additional Care Plan goals for specific interventions    Outcome: Comp values, beliefs, and cultural backgrounds into the planning and delivery of care  - Encourage patient/family to participate in care and decision-making at the level they choose  - Honor patient and family perspectives and choices   Outcome: Completed Date

## 2018-03-15 NOTE — BH PROGRESS NOTE
Behavioral Health SOAP Note  SUBJECTIVE    Patient is a 55 y/o male with a h/o cocaine abuse. The patient was seen at bedside today to follow-up on substance abuse treatment. The patient was in good spirits today.   He reports that he is sleeping well a

## 2018-03-20 NOTE — PAYOR COMM NOTE
--------------  ADMISSION REVIEW     Payor: [address] #:  [id number]  Authorization Number: N/A    Admit date: 3/14/18  Admit time: 2856       Admitting Physician: Noé Zelaya MD  Attending Physician:  No att. providers found  P stridor. Cardiovascular: Positive for chest pain. Negative for palpitations and leg swelling. Positive for stated complaint: Back Pain/ Chest Pain   Other systems are as noted in HPI. Constitutional and vital signs reviewed. Physical Exam[DK. 1] - Abnormal; Notable for the following:     Troponin 0.10 (*)     All other components within normal limits   DRUG ABUSE PANEL 10 SCREEN - Abnormal; Notable for the following:     Ur. Cocaine Screen Detected (*)     All other components within normal limits 03/13/18  7:20 PM   Result Value Ref Range   Glucose 76 70 - 99 mg/dL   Sodium 141 136 - 144 mmol/L   Potassium 3.9 3.3 - 5.1 mmol/L   Chloride 104 95 - 110 mmol/L   CO2 29 22 - 32 mmol/L   BUN 25 (H) 8 - 20 mg/dL   Creatinine 1.79 (H) 0.50 - 1.50 mg/dL MCHC 34.2 32.0 - 37.0 g/dl   RDW 14.7 11.0 - 15.0 %    140 - 400 K/UL   MPV 9.7 7.4 - 10.3 fL   Neutrophil % 65 %   Lymphocyte % 22 %   Monocyte % 11 %   Eosinophil % 1 %   Basophil % 0 %   Neutrophil Absolute 2.5 1.8 - 7.7 K/UL   Lymphocyte Absol PM[DK. 2]           Disposition and Plan     Clinical Impression:[DK.1]  Acute chest pain  (primary encounter diagnosis)  Cocaine abuse  Impaired renal function  Elevated troponin[DK. 2]    Disposition:[DK.1]  Admit  3/13/2018 10:26 pm[DK. 2]    Follow-up:[DK relieving factors, present on the left side radiating towards bilateral upper extremities. Denies any associated shortness of breath palpitations nausea vomiting or diaphoresis. Does admit to recent use of cocaine.   Patient admits to being noncompliant w mood & affect.   Laboratory Data:     Lab Results  Component Value Date   WBC 3.8 03/13/2018   HGB 12.7 03/13/2018   HCT 37.1 03/13/2018    03/13/2018   CREATSERUM 1.79 03/13/2018   BUN 25 03/13/2018    03/13/2018   K 3.9 03/13/2018    03 severe  8.  Hypercholesterolemia, on statin     PLAN:  - ASA  - resume lisinopril/furosemide; will not resume coreg/beta blocker given recurrent cocaine use   - cath reviewed  - records from [facility] admission Feb 2018 reviewed      Subjective:  Denies chest branden ejection  fraction is 20-25%.  normal corns      Lab Results  Component Value Date   WBC 3.2 03/15/2018   HGB 13.2 03/15/2018   HCT 38.8 03/15/2018    03/15/2018         Lab Results  Component Value Date   INR 1.0 03/14/2018         Lab Results  Com 56year old male, past medical history significant for congestive heart failure with an EF of 25%, hypertension, chronic kidney disease and substance abuse presents with a complaint of sudden onset back pain radiating towards his chest while in the shower

## 2018-03-21 PROBLEM — R94.30 CARDIAC LV EJECTION FRACTION 21-40%: Status: ACTIVE | Noted: 2018-03-21

## 2018-03-21 PROBLEM — R77.8 ELEVATED TROPONIN I LEVEL: Status: RESOLVED | Noted: 2018-02-07 | Resolved: 2018-03-21

## 2018-03-21 PROBLEM — R79.89 ELEVATED TROPONIN: Status: RESOLVED | Noted: 2018-03-14 | Resolved: 2018-03-21

## 2018-03-21 PROBLEM — I34.0 NON-RHEUMATIC MITRAL REGURGITATION: Status: ACTIVE | Noted: 2018-03-21

## 2018-03-21 PROBLEM — R07.9 ACUTE CHEST PAIN: Status: RESOLVED | Noted: 2018-02-07 | Resolved: 2018-03-21

## 2018-03-21 PROBLEM — R77.8 ELEVATED TROPONIN: Status: RESOLVED | Noted: 2018-03-14 | Resolved: 2018-03-21

## 2018-03-21 PROBLEM — R79.89 ELEVATED TROPONIN I LEVEL: Status: RESOLVED | Noted: 2018-02-07 | Resolved: 2018-03-21

## 2018-03-26 ENCOUNTER — TELEPHONE (OUTPATIENT)
Dept: CARDIOLOGY CLINIC | Facility: HOSPITAL | Age: 58
End: 2018-03-26

## 2018-03-26 NOTE — TELEPHONE ENCOUNTER
2/21/18 @ 6:27am Received 2 separate authorization forms from [facility] with below information for DX: I50.9 and I50.23:     [facility]#[id number]  Effective: 2/14/18  Expiration: 8/14/18  CPT Codes: 62127, 99451, 61041  Units per CPT code: 5 units each

## 2018-05-24 ENCOUNTER — HOSPITAL ENCOUNTER (EMERGENCY)
Facility: HOSPITAL | Age: 58
Discharge: HOME | End: 2018-05-24
Attending: EMERGENCY MEDICINE
Payer: MEDICAID

## 2018-05-24 ENCOUNTER — APPOINTMENT (OUTPATIENT)
Dept: GENERAL RADIOLOGY | Facility: HOSPITAL | Age: 58
End: 2018-05-24
Attending: EMERGENCY MEDICINE
Payer: MEDICAID

## 2018-05-24 ENCOUNTER — APPOINTMENT (OUTPATIENT)
Dept: CT IMAGING | Facility: HOSPITAL | Age: 58
End: 2018-05-24
Attending: EMERGENCY MEDICINE
Payer: MEDICAID

## 2018-05-24 VITALS
TEMPERATURE: 98 F | SYSTOLIC BLOOD PRESSURE: 117 MMHG | DIASTOLIC BLOOD PRESSURE: 105 MMHG | WEIGHT: 180 LBS | HEIGHT: 71 IN | RESPIRATION RATE: 18 BRPM | BODY MASS INDEX: 25.2 KG/M2 | OXYGEN SATURATION: 98 % | HEART RATE: 78 BPM

## 2018-05-24 DIAGNOSIS — I50.23 ACUTE ON CHRONIC SYSTOLIC HEART FAILURE (HCC): Primary | ICD-10-CM

## 2018-05-24 PROCEDURE — 85025 COMPLETE CBC W/AUTO DIFF WBC: CPT | Performed by: EMERGENCY MEDICINE

## 2018-05-24 PROCEDURE — 96374 THER/PROPH/DIAG INJ IV PUSH: CPT

## 2018-05-24 PROCEDURE — 93010 ELECTROCARDIOGRAM REPORT: CPT | Performed by: EMERGENCY MEDICINE

## 2018-05-24 PROCEDURE — 85379 FIBRIN DEGRADATION QUANT: CPT | Performed by: EMERGENCY MEDICINE

## 2018-05-24 PROCEDURE — 99285 EMERGENCY DEPT VISIT HI MDM: CPT

## 2018-05-24 PROCEDURE — 93005 ELECTROCARDIOGRAM TRACING: CPT

## 2018-05-24 PROCEDURE — 81003 URINALYSIS AUTO W/O SCOPE: CPT | Performed by: EMERGENCY MEDICINE

## 2018-05-24 PROCEDURE — 80076 HEPATIC FUNCTION PANEL: CPT | Performed by: EMERGENCY MEDICINE

## 2018-05-24 PROCEDURE — 80048 BASIC METABOLIC PNL TOTAL CA: CPT | Performed by: EMERGENCY MEDICINE

## 2018-05-24 PROCEDURE — 71045 X-RAY EXAM CHEST 1 VIEW: CPT | Performed by: EMERGENCY MEDICINE

## 2018-05-24 PROCEDURE — 83690 ASSAY OF LIPASE: CPT | Performed by: EMERGENCY MEDICINE

## 2018-05-24 PROCEDURE — 84484 ASSAY OF TROPONIN QUANT: CPT | Performed by: EMERGENCY MEDICINE

## 2018-05-24 PROCEDURE — 74176 CT ABD & PELVIS W/O CONTRAST: CPT | Performed by: EMERGENCY MEDICINE

## 2018-05-24 PROCEDURE — 83880 ASSAY OF NATRIURETIC PEPTIDE: CPT | Performed by: EMERGENCY MEDICINE

## 2018-05-24 RX ORDER — POTASSIUM CHLORIDE 20 MEQ/1
20 TABLET, EXTENDED RELEASE ORAL DAILY
Qty: 5 TABLET | Refills: 0 | Status: SHIPPED | OUTPATIENT
Start: 2018-05-24 | End: 2018-05-29

## 2018-05-24 RX ORDER — FUROSEMIDE 10 MG/ML
40 INJECTION INTRAMUSCULAR; INTRAVENOUS ONCE
Status: COMPLETED | OUTPATIENT
Start: 2018-05-24 | End: 2018-05-24

## 2018-05-24 RX ORDER — LISINOPRIL 2.5 MG/1
2.5 TABLET ORAL 2 TIMES DAILY
Qty: 60 TABLET | Refills: 0 | Status: SHIPPED | OUTPATIENT
Start: 2018-05-24 | End: 2019-06-28

## 2018-05-24 RX ORDER — FUROSEMIDE 40 MG/1
40 TABLET ORAL 2 TIMES DAILY
Qty: 60 TABLET | Refills: 0 | Status: SHIPPED | OUTPATIENT
Start: 2018-05-24 | End: 2018-09-16

## 2018-05-24 RX ORDER — ATORVASTATIN CALCIUM 40 MG/1
40 TABLET, FILM COATED ORAL NIGHTLY
Qty: 30 TABLET | Refills: 0 | Status: SHIPPED | OUTPATIENT
Start: 2018-05-24 | End: 2018-06-23

## 2018-05-24 NOTE — ED INITIAL ASSESSMENT (HPI)
Pt c/o upper abdominal pain radiating into chest.  Pt states he thinks it is due to his umbilical hernia.   Denies n/v/d.

## 2018-05-24 NOTE — ED NOTES
Discharged home with plan to follow up with PCP/[facility] Heart Failure [facility] as indicated. Alert and interactive. Hemodynamically stable.  Ambulates to exit with steady gait

## 2018-05-24 NOTE — ED PROVIDER NOTES
Patient Seen in: [facility] AND [facility] Emergency Department    History   Patient presents with:  Abdomen/Flank Pain (GI/)    Stated Complaint: abdominal pain    HPI    Patient complains of 2 chief complaints.   Complaint #1 is lower abdominal pain has a lulú Oral Tab,  Take 1 tablet (2.5 mg total) by mouth 2 (two) times daily. atorvastatin 40 MG Oral Tab,  Take 1 tablet (40 mg total) by mouth nightly.    furosemide 40 MG Oral Tab,  1 tab daily and as directed   aspirin 81 MG Oral Tab,  Take 81 mg by mouth husam METABOLIC PANEL (8) - Abnormal; Notable for the following:        Result Value    Glucose 102 (*)     Chloride 111 (*)     BUN 31 (*)     Creatinine 1.86 (*)     Calculated Osmolality 299 (*)     GFR, Non- 40 (*)     GFR, -American 4 abdominal wall hernia with approximately 4 x 4 by 2.7 cm region of incarcerated herniated omental fat. 2. Small hiatal hernia. 3. Colonic diverticulosis. Large amount of stool in the colon. 4. Prostate enlargement.  5. Developmental central lumbar canal. 6. 5/24/2018 12:40 PM    START taking these medications    !! atorvastatin 40 MG Oral Tab  Take 1 tablet (40 mg total) by mouth nightly., Normal, Disp-30 tablet, R-0    !! lisinopril 2.5 MG Oral Tab  Take 1 tablet (2.5 mg total) by mouth 2 (two) times daily. , provider.

## 2018-05-24 NOTE — CM/SW NOTE
Urgent Request for Prior Authorization Form Faxed for HF Clinic referral to  Fax: [phone]    Patient has been seen at [address] last visit in March. Clinic called to confirm they state prior Maddie Sauceda is needed.       Patient will be given HF clinic c

## 2018-05-24 NOTE — ED NOTES
Care assumed Alert and interactive Presents with 24 hour hx generalized abd pain that radiates to epigastric Denies N/V/D + known umbilical hernia that was scheduled for scheduled for surgical repair 3 years ago that pt deferred Abd soft + BS LMB yesterday

## 2018-08-24 ENCOUNTER — HOSPITAL ENCOUNTER (EMERGENCY)
Facility: HOSPITAL | Age: 58
Discharge: HOME | End: 2018-08-24
Attending: EMERGENCY MEDICINE
Payer: MEDICAID

## 2018-08-24 ENCOUNTER — APPOINTMENT (OUTPATIENT)
Dept: GENERAL RADIOLOGY | Facility: HOSPITAL | Age: 58
End: 2018-08-24
Payer: MEDICAID

## 2018-08-24 VITALS
OXYGEN SATURATION: 98 % | SYSTOLIC BLOOD PRESSURE: 134 MMHG | HEIGHT: 71 IN | WEIGHT: 178 LBS | RESPIRATION RATE: 20 BRPM | HEART RATE: 80 BPM | DIASTOLIC BLOOD PRESSURE: 113 MMHG | BODY MASS INDEX: 24.92 KG/M2 | TEMPERATURE: 97 F

## 2018-08-24 DIAGNOSIS — I42.9 CARDIOMYOPATHY, UNSPECIFIED TYPE (HCC): ICD-10-CM

## 2018-08-24 DIAGNOSIS — R07.9 CHEST PAIN OF UNCERTAIN ETIOLOGY: Primary | ICD-10-CM

## 2018-08-24 LAB
ANION GAP SERPL CALC-SCNC: 9 MMOL/L (ref 0–18)
BASOPHILS # BLD: 0 K/UL (ref 0–0.2)
BASOPHILS NFR BLD: 1 %
BUN SERPL-MCNC: 20 MG/DL (ref 8–20)
BUN/CREAT SERPL: 10.8 (ref 10–20)
CALCIUM SERPL-MCNC: 8.5 MG/DL (ref 8.5–10.5)
CHLORIDE SERPL-SCNC: 103 MMOL/L (ref 95–110)
CHOLEST SERPL-MCNC: 211 MG/DL (ref 110–200)
CO2 SERPL-SCNC: 26 MMOL/L (ref 22–32)
CREAT SERPL-MCNC: 1.85 MG/DL (ref 0.5–1.5)
D DIMER PPP FEU-MCNC: 0.5 MCG/ML (ref ?–0.56)
EOSINOPHIL # BLD: 0 K/UL (ref 0–0.7)
EOSINOPHIL NFR BLD: 1 %
ERYTHROCYTE [DISTWIDTH] IN BLOOD BY AUTOMATED COUNT: 14.6 % (ref 11–15)
GLUCOSE SERPL-MCNC: 99 MG/DL (ref 70–99)
HCT VFR BLD AUTO: 40.7 % (ref 41–52)
HDLC SERPL-MCNC: 67 MG/DL
HGB BLD-MCNC: 13.2 G/DL (ref 13.5–17.5)
LDLC SERPL CALC-MCNC: 115 MG/DL (ref 0–99)
LYMPHOCYTES # BLD: 1.3 K/UL (ref 1–4)
LYMPHOCYTES NFR BLD: 37 %
MCH RBC QN AUTO: 31.4 PG (ref 27–32)
MCHC RBC AUTO-ENTMCNC: 32.6 G/DL (ref 32–37)
MCV RBC AUTO: 96.3 FL (ref 80–100)
MONOCYTES # BLD: 0.3 K/UL (ref 0–1)
MONOCYTES NFR BLD: 10 %
NEUTROPHILS # BLD AUTO: 1.8 K/UL (ref 1.8–7.7)
NEUTROPHILS NFR BLD: 52 %
NONHDLC SERPL-MCNC: 144 MG/DL
OSMOLALITY UR CALC.SUM OF ELEC: 289 MOSM/KG (ref 275–295)
PLATELET # BLD AUTO: 165 K/UL (ref 140–400)
PMV BLD AUTO: 9.4 FL (ref 7.4–10.3)
POTASSIUM SERPL-SCNC: 3.2 MMOL/L (ref 3.3–5.1)
RBC # BLD AUTO: 4.22 M/UL (ref 4.5–5.9)
SODIUM SERPL-SCNC: 138 MMOL/L (ref 136–144)
TRIGL SERPL-MCNC: 143 MG/DL (ref 1–149)
TROPONIN I SERPL-MCNC: 0.05 NG/ML (ref ?–0.03)
TROPONIN I SERPL-MCNC: 0.05 NG/ML (ref ?–0.03)
WBC # BLD AUTO: 3.5 K/UL (ref 4–11)

## 2018-08-24 PROCEDURE — 36415 COLL VENOUS BLD VENIPUNCTURE: CPT

## 2018-08-24 PROCEDURE — 80048 BASIC METABOLIC PNL TOTAL CA: CPT

## 2018-08-24 PROCEDURE — 99285 EMERGENCY DEPT VISIT HI MDM: CPT

## 2018-08-24 PROCEDURE — 80061 LIPID PANEL: CPT | Performed by: EMERGENCY MEDICINE

## 2018-08-24 PROCEDURE — 71046 X-RAY EXAM CHEST 2 VIEWS: CPT

## 2018-08-24 PROCEDURE — 93005 ELECTROCARDIOGRAM TRACING: CPT

## 2018-08-24 PROCEDURE — 85025 COMPLETE CBC W/AUTO DIFF WBC: CPT | Performed by: EMERGENCY MEDICINE

## 2018-08-24 PROCEDURE — 84484 ASSAY OF TROPONIN QUANT: CPT

## 2018-08-24 PROCEDURE — 93010 ELECTROCARDIOGRAM REPORT: CPT | Performed by: INTERNAL MEDICINE

## 2018-08-24 PROCEDURE — 85379 FIBRIN DEGRADATION QUANT: CPT | Performed by: EMERGENCY MEDICINE

## 2018-08-24 PROCEDURE — 93010 ELECTROCARDIOGRAM REPORT: CPT | Performed by: EMERGENCY MEDICINE

## 2018-08-24 PROCEDURE — 85025 COMPLETE CBC W/AUTO DIFF WBC: CPT

## 2018-08-24 PROCEDURE — 80048 BASIC METABOLIC PNL TOTAL CA: CPT | Performed by: EMERGENCY MEDICINE

## 2018-08-24 PROCEDURE — 84484 ASSAY OF TROPONIN QUANT: CPT | Performed by: EMERGENCY MEDICINE

## 2018-08-24 RX ORDER — ASPIRIN 81 MG/1
324 TABLET, CHEWABLE ORAL ONCE
Status: COMPLETED | OUTPATIENT
Start: 2018-08-24 | End: 2018-08-24

## 2018-08-24 RX ORDER — ASPIRIN 81 MG/1
TABLET, CHEWABLE ORAL
Status: COMPLETED
Start: 2018-08-24 | End: 2018-08-24

## 2018-08-24 NOTE — ED INITIAL ASSESSMENT (HPI)
Pt c/o left sided CP + SOB and abdominal bloating starting this morning. Pt denies n/v/d. No diaphoresis. Made it through work today and came straight here after.  Recent hernia operation

## 2018-08-25 NOTE — ED NOTES
Results reviewed w/ patient by MD. Pt is agreeable and comfortable with DC plan. AVS/DC instructions reviewed and pt understands to FU with Cardiology as instructed by.  Home care instructions reviewed at bedside and patient verbalizes understanding of prov

## 2018-08-25 NOTE — ED PROVIDER NOTES
Patient Seen in: [facility] Emergency Department    History   Patient presents with:  Chest Pain Angina (cardiovascular)      HPI    Patient presents to the ED complaining of left-sided chest pain and shortness of breath.   He states it was started oriented to person, place, and time. He appears well-developed and well-nourished. No distress. HENT:   Head: Normocephalic and atraumatic. Eyes: Conjunctivae are normal. Right eye exhibits no discharge. Left eye exhibits no discharge.    Neck: No trach ------                     CBC W/ DIFFERENTIAL[[id number]]          Abnormal            Final result                 Please view results for these tests on the individual orders.    RAINBOW DRAW BLUE   RAINBOW DRAW LAVENDER   RAINBOW DRAW DARK GREEN   Corwin. Caren Rangel 135 reports. Complicating Factors: The patient already has has Community acquired pneumonia of right lower lobe of lung ([address] Anemia; Acute on chronic systolic heart failure ([address] Substance abuse ([address] HTN (hypertension); Cocaine abuse ([address]  Impaired re

## 2019-05-07 NOTE — PAYOR COMM NOTE
--------------  DISCHARGE REVIEW    Payor: [address] #:  [id number]  Authorization Number: N/A    Admit date: 3/14/18  Admit time:  0032  Discharge Date: 3/15/2018  3:53 PM     Admitting Physician: Karen Davidson MD  Attending Phys Lumbar Epidural Steroid Injection Instructions  Plan includes:  1.  Lumbar epidural steroid injections, up to 3, spaced 4 weeks apart.  If pain control is acceptable after 1 or 2 injections, it was discussed with the patient that they may return for the subsequent injections if and when their pain returns.  The risks were discussed with the patient including failure of relief, worsening pain, Headache (post dural puncture headache), bleeding (epidural hematoma) and infection (epidural abscess or skin infection).  2.  Physical therapy exercises at home as prescribed by physical therapy or from the pain clinic handout (given to the patient).  Continuation of these exercises every day, or multiple times per week, even when the patient has good pain relief, was stressed to the patient as a preventative measure to decrease the frequency and severity of future pain episodes.  3.  Continue pain medicines as already prescribed.  If patient not currently taking any, it is recommended to begin Acetaminophen 1000 mg po q 8 hours.  If other medicines containing Acetaminophen are currently prescribed, maintain daily dose at 3000 mg.    4.  If they can tolerate NSAIDS, it is recommended to take Ibuprofen 600 mg po q 6 hours for 7 days during pain exacerbations.  Alternatively, they may substitute an NSAID of their choice (e.g. Aleve).  This may be taken at the same time as Acetaminophen.  5.  Heat and ice to the affected area as tolerated for pain control.  It was discussed that heating pads can cause burns.  6.  Daily low impact exercise such as walking or water exercise was recommended to maintain overall health and aid in weight control.   7.  Follow up as needed for subsequent injections.  8.  Patient was counseled to abstain from tobacco products.     ([facility] [address]     Substance abuse     HTN (hypertension)     Cocaine abuse     Impaired renal function     Elevated troponin        Physical Exam:   General appearance: alert, appears stated age and cooperative  Pulmonary:  clear to auscultation bilaterally  Cardi monitor closely, avoid nephrotoxic medication, will renally dose current meds.     Cocaine abuse  -Patient counseled encouraged to quit     Consultations:   Cardiology      Procedures:   Cardiac Angiogram    Complications: n/a    Discharge Condition: Good

## 2019-10-07 ENCOUNTER — APPOINTMENT (OUTPATIENT)
Dept: CV DIAGNOSTICS | Facility: HOSPITAL | Age: 59
DRG: 291 | End: 2019-10-07
Attending: INTERNAL MEDICINE
Payer: MEDICAID

## 2019-10-07 ENCOUNTER — HOSPITAL ENCOUNTER (INPATIENT)
Facility: HOSPITAL | Age: 59
LOS: 1 days | Discharge: HOME | DRG: 291 | End: 2019-10-08
Attending: EMERGENCY MEDICINE | Admitting: HOSPITALIST
Payer: MEDICAID

## 2019-10-07 ENCOUNTER — APPOINTMENT (OUTPATIENT)
Dept: GENERAL RADIOLOGY | Facility: HOSPITAL | Age: 59
DRG: 291 | End: 2019-10-07
Attending: EMERGENCY MEDICINE
Payer: MEDICAID

## 2019-10-07 DIAGNOSIS — N28.9 RENAL INSUFFICIENCY: ICD-10-CM

## 2019-10-07 DIAGNOSIS — I50.9 ACUTE ON CHRONIC CONGESTIVE HEART FAILURE, UNSPECIFIED HEART FAILURE TYPE (HCC): Primary | ICD-10-CM

## 2019-10-07 PROCEDURE — 93306 TTE W/DOPPLER COMPLETE: CPT | Performed by: INTERNAL MEDICINE

## 2019-10-07 PROCEDURE — 99222 1ST HOSP IP/OBS MODERATE 55: CPT | Performed by: HOSPITALIST

## 2019-10-07 PROCEDURE — 71045 X-RAY EXAM CHEST 1 VIEW: CPT | Performed by: EMERGENCY MEDICINE

## 2019-10-07 RX ORDER — FUROSEMIDE 10 MG/ML
40 INJECTION INTRAMUSCULAR; INTRAVENOUS ONCE
Status: COMPLETED | OUTPATIENT
Start: 2019-10-07 | End: 2019-10-07

## 2019-10-07 RX ORDER — ORPHENADRINE CITRATE 30 MG/ML
60 INJECTION INTRAMUSCULAR; INTRAVENOUS ONCE
Status: COMPLETED | OUTPATIENT
Start: 2019-10-07 | End: 2019-10-07

## 2019-10-07 RX ORDER — ASPIRIN 81 MG/1
81 TABLET, CHEWABLE ORAL DAILY
Status: DISCONTINUED | OUTPATIENT
Start: 2019-10-07 | End: 2019-10-08

## 2019-10-07 RX ORDER — ATORVASTATIN CALCIUM 40 MG/1
80 TABLET, FILM COATED ORAL NIGHTLY
Status: DISCONTINUED | OUTPATIENT
Start: 2019-10-07 | End: 2019-10-08

## 2019-10-07 RX ORDER — SODIUM CHLORIDE 0.9 % (FLUSH) 0.9 %
10 SYRINGE (ML) INJECTION AS NEEDED
Status: DISCONTINUED | OUTPATIENT
Start: 2019-10-07 | End: 2019-10-08

## 2019-10-07 RX ORDER — FUROSEMIDE 40 MG/1
40 TABLET ORAL 2 TIMES DAILY
Status: ON HOLD | COMMUNITY
End: 2019-10-08

## 2019-10-07 RX ORDER — LISINOPRIL 10 MG/1
10 TABLET ORAL DAILY
Status: ON HOLD | COMMUNITY
End: 2019-10-08

## 2019-10-07 RX ORDER — HEPARIN SODIUM 5000 [USP'U]/ML
5000 INJECTION, SOLUTION INTRAVENOUS; SUBCUTANEOUS EVERY 8 HOURS SCHEDULED
Status: DISCONTINUED | OUTPATIENT
Start: 2019-10-07 | End: 2019-10-08

## 2019-10-07 RX ORDER — POLYETHYLENE GLYCOL 3350 17 G/17G
17 POWDER, FOR SOLUTION ORAL DAILY PRN
Status: DISCONTINUED | OUTPATIENT
Start: 2019-10-07 | End: 2019-10-08

## 2019-10-07 RX ORDER — DIAZEPAM 5 MG/1
5 TABLET ORAL EVERY 6 HOURS PRN
Status: DISCONTINUED | OUTPATIENT
Start: 2019-10-07 | End: 2019-10-08

## 2019-10-07 RX ORDER — PANTOPRAZOLE SODIUM 40 MG/1
40 TABLET, DELAYED RELEASE ORAL
Status: DISCONTINUED | OUTPATIENT
Start: 2019-10-07 | End: 2019-10-08

## 2019-10-07 RX ORDER — NITROGLYCERIN 0.4 MG/1
0.4 TABLET SUBLINGUAL EVERY 5 MIN PRN
Status: DISCONTINUED | OUTPATIENT
Start: 2019-10-07 | End: 2019-10-08

## 2019-10-07 NOTE — ED NOTES
Pt disconnected himself from the monitor and got up to the bathroom without assistance or pressing his call light. RN came to the room as pt was walking back to the bed. Pt states that he was feeling increased SOB and sat down in the chair.  This RN asked p

## 2019-10-07 NOTE — ED INITIAL ASSESSMENT (HPI)
Care assumed from EMS. Pt states that he got out of bed around 0300 and had cramping in bl legs, cp, and sob. Pt denies lightheadedness/dizziness, headache. Pt with hx of CHF & HTN.

## 2019-10-07 NOTE — ED NOTES
This RN noticed the pt standing up again and promptly entered the room asking pt what was wrong. Pt stated that he was short of breath again, so he had to stand up.  RN instructed pt that the sob will get worse when he exerts himself and that he needs to st

## 2019-10-07 NOTE — CONSULTS
[facility]    PATIENT'S NAME: Aubrey Taz   ATTENDING PHYSICIAN: Randolph Connors MD   CONSULTING PHYSICIAN: Wero Mcdowell DO   PATIENT ACCOUNT#:   [de-identified]    LOCATION:  [address] [place] [address] Road #:   [id number]       DATE OF BIRTH:  [birth year] presumed, stage IV with unclear baseline. 3.   Chest pain and shortness of breath, could be due to heart failure, rule out ACS as etiology. Could be cocaine use with vasospasm. 4.   History of recent cocaine abuse.     RECOMMENDATIONS:  Followup troponin

## 2019-10-07 NOTE — ED PROVIDER NOTES
Patient Seen in: [facility] AND [facility] Emergency Department    History   Patient presents with:  Chest Pain Angina (cardiovascular)    Stated Complaint:  CP/SOB    HPI    61-year-old male past male history of hypertension, CHF presents for evaluation of severa (37.1 °C) (Oral)   Resp 17   Ht 180.3 cm (5' 11\")   Wt 77.1 kg   SpO2 100%   BMI 23.71 kg/m²         Physical Exam   Constitutional: No distress. HEENT: MMM. Head: Normocephalic. Eyes: No injection. Cardiovascular: RRR.    Pulmonary/Chest: Effort no 84  Rhythm: Sinus Rhythm  Reading: NSR 84 with lateral TWI and subtle STD without prior for comparison          Preliminary Radiology Report  [facility], [facility]  [phone] - Phone    [address]    NAME: Kimani Scott OF EXAM: 10/07 and physical exam differential diagnosis includes but is not limited to ACS, CHF, anemia, electrolyte derangement.     Pulse ox: 100%:Normal on RA, as interpreted by myself    Cardiac Monitor Interpretation: Pulse Readings from Last 1 Encounters:  10/07/19

## 2019-10-07 NOTE — PLAN OF CARE
Patient has been restless since handoff from previous day shift nurse. Requested stimulant laxative; given prior to dinner.   Patient mentioned taking prilosec for stomach pain; page texting md.  Patient is resting comfortably in bed, locked in the lowest Evaluate fluid balance, assess for edema, trend weights  Outcome: Progressing  Goal: Absence of cardiac arrhythmias or at baseline  Description  INTERVENTIONS:  - Continuous cardiac monitoring, monitor vital signs, obtain 12 lead EKG if indicated  - Evalua

## 2019-10-07 NOTE — PLAN OF CARE
Problem: Patient Centered Care  Goal: Patient preferences are identified and integrated in the patient's plan of care  Description  Interventions:  - What would you like us to know as we care for you?  \" live alone\"  - Provide timely, complete, and accu rate control medications as ordered  - Initiate emergency measures for life threatening arrhythmias  - Monitor electrolytes and administer replacement therapy as ordered  Outcome: Progressing     VSS. Patient c/o anxiety, valium prn given.  6 beats of vtach

## 2019-10-07 NOTE — PROGRESS NOTES
10/07/19 1533   Clinical Encounter Type   Visited With Patient   Routine Visit Introduction  (consult)   Continue Visiting Yes   Patient's Supportive Strategies/Resources sera   Patient Spiritual Encounters   Spiritual Assessment Completed Yes  (Pt req

## 2019-10-07 NOTE — H&P
[address] Patient Status:  Inpatient    1960 MRN [id number]   Location [facility] 3W/SW Attending Margaret Reed MD   Hosp Day # 0 PCP No primary care provider on file.      Date: wheezing. Cardiovascular: Positive for chest pain and leg swelling. Gastrointestinal: Negative for abdominal pain, diarrhea, nausea and vomiting. Genitourinary: Negative for dysuria.          Physical Exam:   Vital Signs:  Blood pressure (!) 112/95, thickening. BONES: No fracture or visible bony lesion. OTHER: Negative. CONCLUSION: Prominence of the pulmonary arteries with cardiac enlargement, which could reflect patient's baseline appearance or CHF.    Dictated by (CST): Adria Fabry, MD on 10/07/201 IMPROVED    Greater than 40 minutes spent, >50% spent counseling re: treatment plan and workup

## 2019-10-08 VITALS
HEIGHT: 71 IN | OXYGEN SATURATION: 96 % | WEIGHT: 155.69 LBS | TEMPERATURE: 98 F | BODY MASS INDEX: 21.8 KG/M2 | RESPIRATION RATE: 17 BRPM | SYSTOLIC BLOOD PRESSURE: 118 MMHG | HEART RATE: 96 BPM | DIASTOLIC BLOOD PRESSURE: 78 MMHG

## 2019-10-08 PROCEDURE — 99239 HOSP IP/OBS DSCHRG MGMT >30: CPT | Performed by: HOSPITALIST

## 2019-10-08 RX ORDER — CYCLOBENZAPRINE HCL 5 MG
5 TABLET ORAL 3 TIMES DAILY PRN
Qty: 30 TABLET | Refills: 0 | Status: SHIPPED | OUTPATIENT
Start: 2019-10-08

## 2019-10-08 RX ORDER — CYCLOBENZAPRINE HCL 5 MG
5 TABLET ORAL 3 TIMES DAILY PRN
Status: DISCONTINUED | OUTPATIENT
Start: 2019-10-08 | End: 2019-10-08

## 2019-10-08 RX ORDER — METOPROLOL SUCCINATE 25 MG/1
25 TABLET, EXTENDED RELEASE ORAL
Status: DISCONTINUED | OUTPATIENT
Start: 2019-10-08 | End: 2019-10-08

## 2019-10-08 RX ORDER — METOPROLOL SUCCINATE 25 MG/1
25 TABLET, EXTENDED RELEASE ORAL
Qty: 30 TABLET | Refills: 0 | Status: SHIPPED | OUTPATIENT
Start: 2019-10-09

## 2019-10-08 RX ORDER — ASPIRIN 81 MG/1
81 TABLET, CHEWABLE ORAL DAILY
Qty: 30 TABLET | Refills: 0 | Status: SHIPPED | OUTPATIENT
Start: 2019-10-09

## 2019-10-08 RX ORDER — ATORVASTATIN CALCIUM 80 MG/1
80 TABLET, FILM COATED ORAL NIGHTLY
Qty: 30 TABLET | Refills: 0 | Status: SHIPPED | OUTPATIENT
Start: 2019-10-08

## 2019-10-08 NOTE — CARDIAC REHAB
CARDIAC REHAB HEART FAILURE EDUCATION    Handouts provided and reviewed: CHF Booklet. Activity: Chair for all meals:        Ambulation:        Tolerated Activity:          Disease Process: Disease process reviewed.     Reviewed the following: Da Thorpe

## 2019-10-08 NOTE — PROGRESS NOTES
Went over discharge instructions with patient, went over new medications, what medications to stop taking, side effects of medications and when to follow up with providers. Extra additional educational handouts given about CHF handed to patient.  Went over

## 2019-10-08 NOTE — PROGRESS NOTES
This writer confirmed with the patient he takes prilosec every day before breakfast.  This writer confirmed with the pharmacist that it is approved to use protonix as a substitute for prilosec; 40 mg administered to patient this evening and will resume mor

## 2019-10-08 NOTE — PLAN OF CARE
Problem: Patient Centered Care  Goal: Patient preferences are identified and integrated in the patient's plan of care  Description  Interventions:  - What would you like us to know as we care for you?  \" live alone\"  - Provide timely, complete, and accu rate control medications as ordered  - Initiate emergency measures for life threatening arrhythmias  - Monitor electrolytes and administer replacement therapy as ordered  Outcome: Progressing     Plan to d/c home today. Frequent rounds.  Educated patient to

## 2019-10-08 NOTE — DISCHARGE SUMMARY
[facility]    Discharge Summary    Gino Smith Patient Status:  Inpatient    1960 MRN [id number]   Location [facility] 3W/SW Attending rBitney Carmichael., MD   Hosp Day # 1 PCP No primary care provider on file.      Date of though with complaints of bilateral lower extremity \"cramping. \"    Hospital Course:   Acute on chronic congestive heart failure, unspecified heart failure type ([address]  - cardiology following  - echo ordered- EF 15-20%  - s/p IV lasix  - strict IsOs  - husam Tabs  Commonly known as:  LASIX        lisinopril 10 MG Tabs  Commonly known as:  PRINIVIL,ZESTRIL              Where to Get Your Medications      These medications were sent to [phone],

## 2019-10-08 NOTE — PROGRESS NOTES
Below note from [facility]. Patient was interviewed and examined. Agree with assessment and plan with edits to the document performed as necessary. S: No cp or sob today. OE lungs clear  A/P Severe CM. Severe MR. Likely cocaine induced. May start bb. affect. Skin: Warm and dry. Laboratory/Data:  Diagnostics:     Echo 10/7/19  Study Conclusions  1. Left ventricle: The cavity size was severely dilated. Systolic     function was severely reduced. The estimated ejection fraction     was 15-20%.   2. Mi 15-20%  - at this point will need to treat conservatively in light of recent cocaine abuse would not recommend lifevest or further intervention at this time as compliance could also be an issue  - noted short runs of Vtach on tele 6 beats and 5 beats long

## 2019-10-08 NOTE — PAYOR COMM NOTE
-REQUESTING 1 INPT DAY    ADMISSION REVIEW     Payor: [address] [place] [address] #:  [id number]  Authorization Number: [id number]    Admit date: 10/7/19  Admit time: 0800       Admitting Physician: Matt Jimenez MD  Attending Physician:  Megan cochran Drug use: Not on file      Review of Systems :  Constitutional: As per HPI  Respiratory: Negative for cough; (+)  shortness of breath. Cardiovascular: (+) chest pain. Positive for stated complaint:   Other systems are as noted in HPI.   Constitution PLATELET    Narrative: The following orders were created for panel order CBC WITH DIFFERENTIAL WITH PLATELET.   Procedure                               Abnormality         Status                     ---------                               ----------- or entity to which it is addressed.  If you are not the intended recipient of this report, you are hereby notified that any copying, distribution, dissemination or action taken in relation to the contents of this report is strictly prohibited and may be unl Mamie Olivas MD on 10/7/2019  7:08 AM            H&P - H&P Note      H&P signed by Cecilia Fam MD at 10/7/2019  3:04 PM     Author:  Cecilia Fam MD Service:  Hospitalist Author Type:  Physician    Filed:  10/7/2019  3:04 PM Date of Service:  10/ Cocaine      Comment: per pt, usage varies, last use was \"2 days ago\"    Allergies/Medications: Allergies: No Known Allergies    Medications Prior to Admission:  furosemide 40 MG Oral Tab Take 40 mg by mouth 2 (two) times daily.    lisinopril 10 MG Oral hours.   COMPARISON: [facility] [place] [facility], XR CHEST PA + LAT CHEST (CPT=71046), 8/24/2018, 16:52. INDICATIONS: Chest pain and shortness of breath x today. TECHNIQUE:   Single view.    FINDINGS:  CARDIAC/VASC: Cardiac silhouette is borderline enlarge monitor   - hold ace    Cocaine use  1.5 days ago  Uses \"monthly\"  - utox screen ordered  - monitor for withdrawal     DVT: subcutaneous heparin  CODE: Full- confirmed with patient   DISPO: pending, await cardiology eval and echo       [place], Given 40 mg Oral Napolean Milks      PEG [facility]) powder packet 17 g     Date Action Dose Route User    Discharged on 10/8/2019    10/7/2019 1818 Given 17 g Oral Napolean Milks        10/7 CARDIOLOGY CONSULT NOTE  ADMISSION DATE:       10/07/2019 unclear baseline. 3.       Chest pain and shortness of breath, could be due to heart failure, rule out ACS as etiology. Could be cocaine use with vasospasm.   4.       History of recent cocaine abuse.     RECOMMENDATIONS:  Followup troponin to be performed 2+ bilateral pedal and radial sites. Neurologic: Alert and oriented, normal affect. Skin: Warm and dry.      Laboratory/Data:  Diagnostics:      Echo 10/7/19  Study Conclusions  1. Left ventricle: The cavity size was severely dilated.  Systolic     functi stable cardiac status and can be discharged today    --------------  DISCHARGE REVIEW    Payor: Genia [address] S #:  [id number]  Authorization Number: [id number]    Admit date: 10/7/19  Admit time:  0800  Discharge Date: 10/8/2019  2:44 PM alert and oriented x3  Pulm: Lungs clear, normal respiratory effort  CV: Heart with regular rate and rhythm  Abd: Abdomen soft, nontender, nondistended, bowel sounds present  Neuro: No acute focal deficits  MSK: Full range of motion in extremities  Skin: W Stable    Discharge Medications:      Discharge Medications      START taking these medications      Instructions Prescription details   aspirin 81 MG Chew  Start taking on:  10/9/2019      Chew 1 tablet (81 mg total) by mouth daily.    Quantity:  30 tablet

## 2021-07-20 NOTE — PLAN OF CARE
Problem: CARDIOVASCULAR - ADULT  Goal: Maintains optimal cardiac output and hemodynamic stability  Description  INTERVENTIONS:  - Monitor vital signs, rhythm, and trends  - Monitor for bleeding, hypotension and signs of decreased cardiac output  - Evalua independent

## 2022-03-03 NOTE — ED NOTES
Patient informed of results and dr directives. Verbalized understanding     Pt asleep in room, easily arousable and reports chest pain as 10/10. Pt promptly fell back asleep and was again fairly easy to arouse. Dr. Alvina Johnston aware.  6 hour EKG and troponin collected and OK per Dr. Alvina Johnston to send pt to floor without results of 6 hr t

## (undated) NOTE — ED AVS SNAPSHOT
Jerri Paulino   MRN: [id number]    Department:  [facility] Emergency Department   Date of Visit:  8/24/2018           Disclosure     Insurance plans vary and the physician(s) referred by the ER may not be covered by your plan.  Please contact yo CARE PHYSICIAN AT ONCE OR RETURN IMMEDIATELY TO THE EMERGENCY DEPARTMENT. If you have been prescribed any medication(s), please fill your prescription right away and begin taking the medication(s) as directed.   If you believe that any of the medications

## (undated) NOTE — ED AVS SNAPSHOT
Ronnie Taylor   MRN: [id number]    Department:  [facility] Emergency Department   Date of Visit:  12/31/2017           Disclosure     Insurance plans vary and the physician(s) referred by the ER may not be covered by your plan.  Please contact y CARE PHYSICIAN AT ONCE OR RETURN IMMEDIATELY TO THE EMERGENCY DEPARTMENT. If you have been prescribed any medication(s), please fill your prescription right away and begin taking the medication(s) as directed.   If you believe that any of the medications

## (undated) NOTE — ED AVS SNAPSHOT
Marie Brown   MRN: [id number]    Department:  [facility] Emergency Department   Date of Visit:  5/24/2018           Disclosure     Insurance plans vary and the physician(s) referred by the ER may not be covered by your plan.  Please contact yo CARE PHYSICIAN AT ONCE OR RETURN IMMEDIATELY TO THE EMERGENCY DEPARTMENT. If you have been prescribed any medication(s), please fill your prescription right away and begin taking the medication(s) as directed.   If you believe that any of the medications